# Patient Record
Sex: FEMALE | Race: WHITE | Employment: OTHER | URBAN - METROPOLITAN AREA
[De-identification: names, ages, dates, MRNs, and addresses within clinical notes are randomized per-mention and may not be internally consistent; named-entity substitution may affect disease eponyms.]

---

## 2018-01-01 ENCOUNTER — OFFICE VISIT (OUTPATIENT)
Dept: CARDIOLOGY | Facility: CLINIC | Age: 58
End: 2018-01-01
Attending: INTERNAL MEDICINE
Payer: MEDICARE

## 2018-01-01 ENCOUNTER — HOSPITAL ENCOUNTER (OUTPATIENT)
Facility: CLINIC | Age: 58
End: 2018-01-01
Admitting: INTERNAL MEDICINE
Payer: MEDICARE

## 2018-01-01 ENCOUNTER — CARE COORDINATION (OUTPATIENT)
Dept: CARDIOLOGY | Facility: CLINIC | Age: 58
End: 2018-01-01

## 2018-01-01 ENCOUNTER — TELEPHONE (OUTPATIENT)
Dept: GASTROENTEROLOGY | Facility: CLINIC | Age: 58
End: 2018-01-01

## 2018-01-01 ENCOUNTER — HOSPITAL ENCOUNTER (OUTPATIENT)
Dept: CARDIOLOGY | Facility: CLINIC | Age: 58
Discharge: HOME | End: 2018-09-04
Attending: INTERNAL MEDICINE | Admitting: INTERNAL MEDICINE
Payer: MEDICARE

## 2018-01-01 ENCOUNTER — RADIANT APPOINTMENT (OUTPATIENT)
Dept: ULTRASOUND IMAGING | Facility: CLINIC | Age: 58
End: 2018-01-01
Attending: INTERNAL MEDICINE
Payer: MEDICARE

## 2018-01-01 ENCOUNTER — TELEPHONE (OUTPATIENT)
Dept: TRANSPLANT | Facility: CLINIC | Age: 58
End: 2018-01-01

## 2018-01-01 ENCOUNTER — TRANSFERRED RECORDS (OUTPATIENT)
Dept: HEALTH INFORMATION MANAGEMENT | Facility: CLINIC | Age: 58
End: 2018-01-01

## 2018-01-01 ENCOUNTER — PATIENT OUTREACH (OUTPATIENT)
Dept: CARE COORDINATION | Facility: CLINIC | Age: 58
End: 2018-01-01

## 2018-01-01 ENCOUNTER — ALLIED HEALTH/NURSE VISIT (OUTPATIENT)
Dept: TRANSPLANT | Facility: CLINIC | Age: 58
End: 2018-01-01
Attending: INTERNAL MEDICINE
Payer: MEDICARE

## 2018-01-01 ENCOUNTER — COMMITTEE REVIEW (OUTPATIENT)
Dept: TRANSPLANT | Facility: CLINIC | Age: 58
End: 2018-01-01

## 2018-01-01 ENCOUNTER — OFFICE VISIT (OUTPATIENT)
Dept: CARDIOLOGY | Facility: CLINIC | Age: 58
End: 2018-01-01
Attending: THORACIC SURGERY (CARDIOTHORACIC VASCULAR SURGERY)
Payer: MEDICARE

## 2018-01-01 ENCOUNTER — HOSPITAL ENCOUNTER (OUTPATIENT)
Dept: CT IMAGING | Facility: CLINIC | Age: 58
Discharge: HOME | End: 2018-10-04
Attending: INTERNAL MEDICINE | Admitting: INTERNAL MEDICINE
Payer: MEDICARE

## 2018-01-01 ENCOUNTER — PRE VISIT (OUTPATIENT)
Dept: PULMONOLOGY | Facility: CLINIC | Age: 58
End: 2018-01-01

## 2018-01-01 ENCOUNTER — PRE VISIT (OUTPATIENT)
Dept: CARDIOLOGY | Facility: CLINIC | Age: 58
End: 2018-01-01

## 2018-01-01 ENCOUNTER — TELEPHONE (OUTPATIENT)
Dept: CARDIOLOGY | Facility: CLINIC | Age: 58
End: 2018-01-01

## 2018-01-01 ENCOUNTER — OFFICE VISIT (OUTPATIENT)
Dept: GASTROENTEROLOGY | Facility: CLINIC | Age: 58
End: 2018-01-01
Attending: INTERNAL MEDICINE
Payer: MEDICARE

## 2018-01-01 VITALS — HEIGHT: 62 IN | BODY MASS INDEX: 23.92 KG/M2 | WEIGHT: 130 LBS

## 2018-01-01 VITALS
SYSTOLIC BLOOD PRESSURE: 138 MMHG | HEIGHT: 60 IN | HEART RATE: 78 BPM | WEIGHT: 130 LBS | OXYGEN SATURATION: 100 % | BODY MASS INDEX: 25.52 KG/M2 | DIASTOLIC BLOOD PRESSURE: 81 MMHG

## 2018-01-01 VITALS
BODY MASS INDEX: 21.5 KG/M2 | OXYGEN SATURATION: 100 % | HEART RATE: 65 BPM | HEIGHT: 60 IN | WEIGHT: 109.5 LBS | SYSTOLIC BLOOD PRESSURE: 122 MMHG | DIASTOLIC BLOOD PRESSURE: 78 MMHG

## 2018-01-01 VITALS
BODY MASS INDEX: 21.55 KG/M2 | HEART RATE: 65 BPM | WEIGHT: 109.8 LBS | HEIGHT: 60 IN | SYSTOLIC BLOOD PRESSURE: 122 MMHG | DIASTOLIC BLOOD PRESSURE: 78 MMHG

## 2018-01-01 DIAGNOSIS — Z79.899 OTHER LONG TERM (CURRENT) DRUG THERAPY: ICD-10-CM

## 2018-01-01 DIAGNOSIS — I35.0 SEVERE AORTIC STENOSIS: ICD-10-CM

## 2018-01-01 DIAGNOSIS — K74.60 CIRRHOSIS (H): ICD-10-CM

## 2018-01-01 DIAGNOSIS — Z01.818 PRE-TRANSPLANT EVALUATION FOR LIVER TRANSPLANT: Primary | ICD-10-CM

## 2018-01-01 DIAGNOSIS — K74.60 HEPATIC CIRRHOSIS DUE TO CHRONIC HEPATITIS C INFECTION (H): Primary | ICD-10-CM

## 2018-01-01 DIAGNOSIS — B19.20 HEPATITIS C: ICD-10-CM

## 2018-01-01 DIAGNOSIS — B19.20 HEPATITIS C VIRUS: ICD-10-CM

## 2018-01-01 DIAGNOSIS — I35.0 AORTIC VALVE STENOSIS, ETIOLOGY OF CARDIAC VALVE DISEASE UNSPECIFIED: ICD-10-CM

## 2018-01-01 DIAGNOSIS — Z76.82 AWAITING ORGAN TRANSPLANT STATUS: Primary | ICD-10-CM

## 2018-01-01 DIAGNOSIS — I35.0 SEVERE AORTIC STENOSIS: Primary | ICD-10-CM

## 2018-01-01 DIAGNOSIS — K72.10 END-STAGE LIVER DISEASE (H): ICD-10-CM

## 2018-01-01 DIAGNOSIS — F17.200 SMOKER: ICD-10-CM

## 2018-01-01 DIAGNOSIS — I35.0 NONRHEUMATIC AORTIC VALVE STENOSIS: Primary | ICD-10-CM

## 2018-01-01 DIAGNOSIS — K74.60 CIRRHOSIS (H): Primary | ICD-10-CM

## 2018-01-01 DIAGNOSIS — R09.89 CAROTID BRUIT, UNSPECIFIED LATERALITY: ICD-10-CM

## 2018-01-01 DIAGNOSIS — B18.2 HEPATIC CIRRHOSIS DUE TO CHRONIC HEPATITIS C INFECTION (H): Primary | ICD-10-CM

## 2018-01-01 DIAGNOSIS — Z01.818 PRE-OP EXAM: Primary | ICD-10-CM

## 2018-01-01 DIAGNOSIS — B19.20 HEPATITIS C VIRUS: Primary | ICD-10-CM

## 2018-01-01 DIAGNOSIS — B19.20 HEPATITIS C: Primary | ICD-10-CM

## 2018-01-01 DIAGNOSIS — R93.1 ABNORMAL FINDINGS DIAGNOSTIC IMAGING OF HEART AND CORONARY CIRCULATION: ICD-10-CM

## 2018-01-01 LAB
ABO + RH BLD: NORMAL
AFP SERPL-MCNC: 2.9 UG/L (ref 0–8)
ALBUMIN SERPL-MCNC: 1.7 G/DL (ref 3.4–5)
ALBUMIN SERPL-MCNC: 2.2 G/DL (ref 3.4–5)
ALBUMIN SERPL-MCNC: 2.9 G/DL (ref 3.4–5)
ALBUMIN UR-MCNC: NEGATIVE MG/DL
ALP SERPL-CCNC: 126 U/L (ref 40–150)
ALP SERPL-CCNC: 127 U/L (ref 40–150)
ALP SERPL-CCNC: 164 U/L (ref 40–150)
ALT SERPL W P-5'-P-CCNC: 35 U/L (ref 0–50)
ALT SERPL W P-5'-P-CCNC: 38 U/L (ref 0–50)
ALT SERPL W P-5'-P-CCNC: 43 U/L (ref 0–50)
ALT SERPL-CCNC: 45 U/L (ref 7–45)
ANION GAP SERPL CALCULATED.3IONS-SCNC: 10 MMOL/L (ref 3–14)
ANION GAP SERPL CALCULATED.3IONS-SCNC: 8 MMOL/L (ref 3–14)
ANION GAP SERPL CALCULATED.3IONS-SCNC: 8 MMOL/L (ref 3–14)
APPEARANCE UR: ABNORMAL
AST SERPL W P-5'-P-CCNC: 62 U/L (ref 0–45)
AST SERPL W P-5'-P-CCNC: 72 U/L (ref 0–45)
AST SERPL W P-5'-P-CCNC: 99 U/L (ref 0–45)
AST SERPL-CCNC: 106 U/L (ref 8–43)
BACTERIA SPEC CULT: NORMAL
BILIRUB DIRECT SERPL-MCNC: 2.2 MG/DL (ref 0–0.2)
BILIRUB DIRECT SERPL-MCNC: 2.7 MG/DL (ref 0–0.2)
BILIRUB SERPL-MCNC: 4.6 MG/DL (ref 0.2–1.3)
BILIRUB SERPL-MCNC: 7.2 MG/DL (ref 0.2–1.3)
BILIRUB SERPL-MCNC: 7.5 MG/DL (ref 0.2–1.3)
BILIRUB UR QL STRIP: NEGATIVE
BLD GP AB SCN SERPL QL: NORMAL
BLD GP AB SCN TITR SERPL: NORMAL {TITER}
BLOOD BANK CMNT PATIENT-IMP: NORMAL
BUN SERPL-MCNC: 15 MG/DL (ref 7–30)
BUN SERPL-MCNC: 34 MG/DL (ref 7–30)
BUN SERPL-MCNC: 42 MG/DL (ref 7–30)
CALCIUM SERPL-MCNC: 8.3 MG/DL (ref 8.5–10.1)
CALCIUM SERPL-MCNC: 8.4 MG/DL (ref 8.5–10.1)
CALCIUM SERPL-MCNC: 8.8 MG/DL (ref 8.5–10.1)
CHLORIDE SERPL-SCNC: 107 MMOL/L (ref 94–109)
CHLORIDE SERPL-SCNC: 108 MMOL/L (ref 94–109)
CHLORIDE SERPL-SCNC: 115 MMOL/L (ref 94–109)
CHOLEST SERPL-MCNC: 72 MG/DL
CO2 SERPL-SCNC: 19 MMOL/L (ref 20–32)
CO2 SERPL-SCNC: 22 MMOL/L (ref 20–32)
CO2 SERPL-SCNC: 22 MMOL/L (ref 20–32)
COLOR UR AUTO: ABNORMAL
CREAT SERPL-MCNC: 0.88 MG/DL (ref 0.59–1.04)
CREAT SERPL-MCNC: 0.93 MG/DL (ref 0.52–1.04)
CREAT SERPL-MCNC: 1.85 MG/DL (ref 0.52–1.04)
CREAT SERPL-MCNC: 2.82 MG/DL (ref 0.52–1.04)
CREAT UR-MCNC: 153 MG/DL
DEPRECATED CALCIDIOL+CALCIFEROL SERPL-MC: 6 UG/L (ref 20–75)
DLCOUNC-%PRED-PRE: 41 %
DLCOUNC-PRE: 8.38 ML/MIN/MMHG
DLCOUNC-PRED: 20.35 ML/MIN/MMHG
ERV-%PRED-PRE: 28 %
ERV-PRE: 0.23 L
ERV-PRED: 0.83 L
ERYTHROCYTE [DISTWIDTH] IN BLOOD BY AUTOMATED COUNT: 20 % (ref 10–15)
ERYTHROCYTE [DISTWIDTH] IN BLOOD BY AUTOMATED COUNT: 21.2 % (ref 10–15)
ERYTHROCYTE [DISTWIDTH] IN BLOOD BY AUTOMATED COUNT: ABNORMAL % (ref 10–15)
ETHYL GLUCURONIDE UR QL: NEGATIVE
EXPTIME-PRE: 6.24 SEC
FEF2575-%PRED-PRE: 48 %
FEF2575-PRE: 1.07 L/SEC
FEF2575-PRED: 2.2 L/SEC
FEFMAX-%PRED-PRE: 40 %
FEFMAX-PRE: 2.37 L/SEC
FEFMAX-PRED: 5.86 L/SEC
FEV1-%PRED-PRE: 56 %
FEV1-PRE: 1.26 L
FEV1FEV6-PRE: 77 %
FEV1FEV6-PRED: 81 %
FEV1FVC-PRE: 77 %
FEV1FVC-PRED: 79 %
FEV1SVC-PRE: 70 %
FEV1SVC-PRED: 80 %
FIFMAX-PRE: 2.12 L/SEC
FRCPLETH-%PRED-PRE: 104 %
FRCPLETH-PRE: 2.58 L
FRCPLETH-PRED: 2.47 L
FVC-%PRED-PRE: 58 %
FVC-PRE: 1.65 L
FVC-PRED: 2.82 L
GFR SERPL CREATININE-BSD FRML MDRD: 17 ML/MIN/1.7M2
GFR SERPL CREATININE-BSD FRML MDRD: 28 ML/MIN/1.7M2
GFR SERPL CREATININE-BSD FRML MDRD: 62 ML/MIN/1.7M2
GFR SERPL CREATININE-BSD FRML MDRD: 74 ML/MIN/BSA
GLUCOSE SERPL-MCNC: 104 MG/DL (ref 70–99)
GLUCOSE SERPL-MCNC: 107 MG/DL (ref 70–140)
GLUCOSE SERPL-MCNC: 110 MG/DL (ref 70–99)
GLUCOSE SERPL-MCNC: 155 MG/DL (ref 70–99)
GLUCOSE UR STRIP-MCNC: NEGATIVE MG/DL
HBA1C MFR BLD: 4.2 % (ref 4–5.6)
HCG SERPL QL: NEGATIVE
HCT VFR BLD AUTO: 21.9 % (ref 35–47)
HCT VFR BLD AUTO: 28.4 % (ref 35–47)
HCT VFR BLD AUTO: 29.5 % (ref 35–47)
HDLC SERPL-MCNC: 15 MG/DL
HEP C HIM: ABNORMAL
HGB BLD-MCNC: 6.9 G/DL (ref 11.7–15.7)
HGB BLD-MCNC: 9.5 G/DL (ref 11.7–15.7)
HGB BLD-MCNC: 9.8 G/DL (ref 11.7–15.7)
HGB UR QL STRIP: ABNORMAL
HYALINE CASTS #/AREA URNS LPF: 26 /LPF (ref 0–2)
IC-%PRED-PRE: 78 %
IC-PRE: 1.56 L
IC-PRED: 2 L
INR PPP: 2.62 (ref 0.86–1.14)
INR PPP: 2.89 (ref 0.86–1.14)
INTERPRETATION ECG - MUSE: NORMAL
IRON SATN MFR SERPL: 91 % (ref 15–46)
IRON SERPL-MCNC: 133 UG/DL (ref 35–180)
KETONES UR STRIP-MCNC: NEGATIVE MG/DL
LDLC SERPL CALC-MCNC: 46 MG/DL
LEUKOCYTE ESTERASE UR QL STRIP: ABNORMAL
M TB TUBERC IFN-G BLD QL: NEGATIVE
M TB TUBERC IFN-G/MITOGEN IGNF BLD: 0 IU/ML
MCH RBC QN AUTO: 31.8 PG (ref 26.5–33)
MCH RBC QN AUTO: 34 PG (ref 26.5–33)
MCH RBC QN AUTO: 34.7 PG (ref 26.5–33)
MCHC RBC AUTO-ENTMCNC: 31.5 G/DL (ref 31.5–36.5)
MCHC RBC AUTO-ENTMCNC: 33.2 G/DL (ref 31.5–36.5)
MCHC RBC AUTO-ENTMCNC: 33.5 G/DL (ref 31.5–36.5)
MCV RBC AUTO: 104 FL (ref 78–100)
MCV RBC AUTO: 108 FL (ref 78–100)
MCV RBC AUTO: 96 FL (ref 78–100)
MUCOUS THREADS #/AREA URNS LPF: PRESENT /LPF
NITRATE UR QL: NEGATIVE
NONHDLC SERPL-MCNC: 57 MG/DL
PH UR STRIP: 5 PH (ref 5–7)
PHOSPHATE SERPL-MCNC: 5.5 MG/DL (ref 2.5–4.5)
PLATELET # BLD AUTO: 40 10E9/L (ref 150–450)
PLATELET # BLD AUTO: 54 10E9/L (ref 150–450)
PLATELET # BLD AUTO: 65 10E9/L (ref 150–450)
POTASSIUM SERPL-SCNC: 3.5 MMOL/L (ref 3.4–5.3)
POTASSIUM SERPL-SCNC: 4.2 MMOL/L (ref 3.4–5.3)
POTASSIUM SERPL-SCNC: 4.2 MMOL/L (ref 3.4–5.3)
POTASSIUM SERPL-SCNC: 4.2 MMOL/L (ref 3.6–5.2)
PROT SERPL-MCNC: 6.7 G/DL (ref 6.8–8.8)
PROT SERPL-MCNC: 7 G/DL (ref 6.8–8.8)
PROT SERPL-MCNC: 7.7 G/DL (ref 6.8–8.8)
PROT UR-MCNC: 0.36 G/L
PROT/CREAT 24H UR: 0.24 G/G CR (ref 0–0.2)
RADIOLOGIST FLAGS: NORMAL
RBC # BLD AUTO: 2.03 10E12/L (ref 3.8–5.2)
RBC # BLD AUTO: 2.74 10E12/L (ref 3.8–5.2)
RBC # BLD AUTO: 3.08 10E12/L (ref 3.8–5.2)
RBC #/AREA URNS AUTO: 5 /HPF (ref 0–2)
RVPLETH-%PRED-PRE: 140 %
RVPLETH-PRE: 2.35 L
RVPLETH-PRED: 1.67 L
SODIUM SERPL-SCNC: 136 MMOL/L (ref 133–144)
SODIUM SERPL-SCNC: 138 MMOL/L (ref 133–144)
SODIUM SERPL-SCNC: 144 MMOL/L (ref 133–144)
SOURCE: ABNORMAL
SP GR UR STRIP: 1.02 (ref 1–1.03)
SPECIMEN EXP DATE BLD: NORMAL
SPECIMEN EXP DATE BLD: NORMAL
SPECIMEN SOURCE: NORMAL
SQUAMOUS #/AREA URNS AUTO: 8 /HPF (ref 0–1)
T PALLIDUM AB SER QL: NONREACTIVE
TIBC SERPL-MCNC: 146 UG/DL (ref 240–430)
TLCPLETH-%PRED-PRE: 97 %
TLCPLETH-PRE: 4.15 L
TLCPLETH-PRED: 4.27 L
TRANS CELLS #/AREA URNS HPF: 2 /HPF
TRANSFERRIN SERPL-MCNC: 117 MG/DL (ref 210–360)
TRIGL SERPL-MCNC: 53 MG/DL
TSH SERPL-ACNC: 1.2 MIU/L (ref 0.3–4.2)
UROBILINOGEN UR STRIP-MCNC: 0 MG/DL (ref 0–2)
VA-%PRED-PRE: 77 %
VA-PRE: 3.43 L
VC-%PRED-PRE: 63 %
VC-PRE: 1.8 L
VC-PRED: 2.83 L
WBC # BLD AUTO: 6.1 10E9/L (ref 4–11)
WBC # BLD AUTO: 8.1 10E9/L (ref 4–11)
WBC # BLD AUTO: 9.4 10E9/L (ref 4–11)
WBC #/AREA URNS AUTO: 17 /HPF (ref 0–5)

## 2018-01-01 PROCEDURE — 82306 VITAMIN D 25 HYDROXY: CPT | Performed by: INTERNAL MEDICINE

## 2018-01-01 PROCEDURE — 71275 CT ANGIOGRAPHY CHEST: CPT | Mod: 26 | Performed by: INTERNAL MEDICINE

## 2018-01-01 PROCEDURE — 85027 COMPLETE CBC AUTOMATED: CPT | Performed by: INTERNAL MEDICINE

## 2018-01-01 PROCEDURE — 85610 PROTHROMBIN TIME: CPT | Performed by: INTERNAL MEDICINE

## 2018-01-01 PROCEDURE — 93005 ELECTROCARDIOGRAM TRACING: CPT | Mod: ZF

## 2018-01-01 PROCEDURE — 84466 ASSAY OF TRANSFERRIN: CPT | Performed by: INTERNAL MEDICINE

## 2018-01-01 PROCEDURE — 84156 ASSAY OF PROTEIN URINE: CPT | Mod: XU | Performed by: INTERNAL MEDICINE

## 2018-01-01 PROCEDURE — 99204 OFFICE O/P NEW MOD 45 MIN: CPT | Mod: 25 | Performed by: INTERNAL MEDICINE

## 2018-01-01 PROCEDURE — 86900 BLOOD TYPING SEROLOGIC ABO: CPT | Performed by: INTERNAL MEDICINE

## 2018-01-01 PROCEDURE — 25000128 H RX IP 250 OP 636: Performed by: INTERNAL MEDICINE

## 2018-01-01 PROCEDURE — 93306 TTE W/DOPPLER COMPLETE: CPT | Mod: 26 | Performed by: INTERNAL MEDICINE

## 2018-01-01 PROCEDURE — 81001 URINALYSIS AUTO W/SCOPE: CPT | Performed by: INTERNAL MEDICINE

## 2018-01-01 PROCEDURE — 93306 TTE W/DOPPLER COMPLETE: CPT

## 2018-01-01 PROCEDURE — 87086 URINE CULTURE/COLONY COUNT: CPT | Performed by: INTERNAL MEDICINE

## 2018-01-01 PROCEDURE — 74174 CTA ABD&PLVS W/CONTRAST: CPT | Mod: 26 | Performed by: INTERNAL MEDICINE

## 2018-01-01 PROCEDURE — G0463 HOSPITAL OUTPT CLINIC VISIT: HCPCS | Mod: 25,ZF

## 2018-01-01 PROCEDURE — 36415 COLL VENOUS BLD VENIPUNCTURE: CPT | Performed by: INTERNAL MEDICINE

## 2018-01-01 PROCEDURE — 80321 ALCOHOLS BIOMARKERS 1OR 2: CPT | Performed by: INTERNAL MEDICINE

## 2018-01-01 PROCEDURE — 80048 BASIC METABOLIC PNL TOTAL CA: CPT | Performed by: INTERNAL MEDICINE

## 2018-01-01 PROCEDURE — 86780 TREPONEMA PALLIDUM: CPT | Performed by: INTERNAL MEDICINE

## 2018-01-01 PROCEDURE — 82105 ALPHA-FETOPROTEIN SERUM: CPT | Performed by: INTERNAL MEDICINE

## 2018-01-01 PROCEDURE — 80053 COMPREHEN METABOLIC PANEL: CPT | Performed by: INTERNAL MEDICINE

## 2018-01-01 PROCEDURE — 93010 ELECTROCARDIOGRAM REPORT: CPT | Mod: ZP | Performed by: INTERNAL MEDICINE

## 2018-01-01 PROCEDURE — 86886 COOMBS TEST INDIRECT TITER: CPT | Performed by: INTERNAL MEDICINE

## 2018-01-01 PROCEDURE — 74174 CTA ABD&PLVS W/CONTRAST: CPT

## 2018-01-01 PROCEDURE — 99205 OFFICE O/P NEW HI 60 MIN: CPT | Mod: GC | Performed by: INTERNAL MEDICINE

## 2018-01-01 PROCEDURE — 86480 TB TEST CELL IMMUN MEASURE: CPT | Performed by: INTERNAL MEDICINE

## 2018-01-01 PROCEDURE — 80076 HEPATIC FUNCTION PANEL: CPT | Performed by: INTERNAL MEDICINE

## 2018-01-01 RX ORDER — BACLOFEN 20 MG
500 TABLET ORAL DAILY
COMMUNITY

## 2018-01-01 RX ORDER — NADOLOL 80 MG/1
80 TABLET ORAL DAILY
COMMUNITY
Start: 2018-01-01

## 2018-01-01 RX ORDER — PYRIDOXINE HCL (VITAMIN B6) 100 MG
1 TABLET ORAL SEE ADMIN INSTRUCTIONS
COMMUNITY

## 2018-01-01 RX ORDER — IOPAMIDOL 755 MG/ML
70 INJECTION, SOLUTION INTRAVASCULAR ONCE
Status: COMPLETED | OUTPATIENT
Start: 2018-01-01 | End: 2018-01-01

## 2018-01-01 RX ORDER — LACTULOSE 10 G/15ML
10 SOLUTION ORAL; RECTAL SEE ADMIN INSTRUCTIONS
COMMUNITY
Start: 2018-01-01

## 2018-01-01 RX ORDER — OMEGA-3S/DHA/EPA/FISH OIL/D3 300MG-1000
400 CAPSULE ORAL SEE ADMIN INSTRUCTIONS
COMMUNITY

## 2018-01-01 RX ORDER — ACETAMINOPHEN 500 MG
1000 TABLET ORAL
COMMUNITY

## 2018-01-01 RX ORDER — PANTOPRAZOLE SODIUM 40 MG/1
40 TABLET, DELAYED RELEASE ORAL 2 TIMES DAILY
COMMUNITY
Start: 2018-01-01

## 2018-01-01 RX ADMIN — IOPAMIDOL 70 ML: 755 INJECTION, SOLUTION INTRAVENOUS at 13:05

## 2018-01-01 ASSESSMENT — PAIN SCALES - GENERAL
PAINLEVEL: NO PAIN (0)

## 2018-08-03 NOTE — TELEPHONE ENCOUNTER
Received a call from Dr Nolberto Aguilar- ([place]) Has a patient he sees in Outreach in [facility]. Per Insurance- has to pay 20 % thru them and and better [place] the  of [place]  He will fax his clinic notes labs and tests done. He stated echo done there- Aorta Stenosis and would need Cardiac evaluation  MELD 24      Intake Progress Note    Organ:  Liver     Initial Call Made by: call from Dr Aguilar with pt name, , Phone #    Referring Physician: PCP- Dr Mateo [place]     Assigned Coordinator: Camila Clements    Reported Diagnosis: Cirrhosis     On Dialysis: No    Dialysis Schedule:  Days/shift  or N/A    Reported Medical History: Cirrhosis- Paracentesis- was as needed and was often- better control lately     Records:  I  will request.     Clinic RN Appt (Only Adult Kidney, Liver and Pancreas Referrals): Y or N    Preferred Evaluation Start Date:  ASAP     Online Forms    Best time patient can be reached:any time     Type of packet sent:  Liver packet      Misc. Notes: May speak with emergency contacts listed in OTTR. Boyfriend- Ajay or Son -Ayden      Verbal Consent: Y     Email Consent: Y or N    If no PCP or referring information the time of call informed pt to call back with information: Y or N    Informed patient to call if doesn't receive packet and or phone call from coordinator within given time- Y    Insurance- Medicare- [id number]  [employer] [id number]  [facility]- [id number]

## 2018-08-03 NOTE — LETTER
August 3, 2018    Jessica Hallman  [address]  [facility] [address]      Dear Jessica,    Thank you for your interest in the Transplant Center at [facility], [facility]. We look forward to being a part of your care team and assisting you through the transplant process.    As we discussed, your transplant coordinator is Camila Clements (Liver).  You may call your coordinator at any time with questions or concern, call [phone] option 4 .    Please complete the following.    1. Fill out and return the enclosed forms    Authorization for Electronic Communication    Authorization to Discuss Protected Health Information    "[employer] Release of Information    2. Sign up for:    [employer], access to your electronic medical record (see enclosed pamphlet)    [employer], a transplant education website (see enclosed booklet)    You can use these tools to learn more about your transplant, communicate with your care team, and track your medical details    Sincerely,    Solid Organ Transplant  [facility], [facility]    cc: Care Team

## 2018-08-03 NOTE — LETTER
August 3, 2018    Jessica Hallman  [address]  [facility] [address]    Dear Jessica,    Thank you for your interest in the Transplant Center at [facility], [facility]. We look forward to being a part of your care team and assisting you through the transplant process.    As we discussed, your transplant coordinator is Camila Clements (Liver).  You may call your coordinator at any time with questions or concerns, call [phone] option 4.    Please complete the following.    1. Fill out and return the enclosed forms    Authorization to Discuss Protected Health Information    [employer] Release of Information    2. Sign up for:    [employer], access to your electronic medical record (see enclosed pamphlet)    [employer], a transplant education website (see enclosed booklet)    You can use these tools to learn more about your transplant, communicate with your care team, and track your medical details      Sincerely,      Solid Organ Transplant  [facility], [facility]    cc: Care Team

## 2018-08-03 NOTE — Clinical Note
Eval on 9/4 , please have labs, liver US and then the txp team appointments.  I may need to do 1:1 teaching unless you can set up other tests/consult on the following Monday, which I would prefer.

## 2018-08-09 NOTE — TELEPHONE ENCOUNTER
"Referred by:Dr Nolberto Aguilar- [facility].      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 6/2018 admitted for worsening ascites and hematemesis with EGD- no bleeding, grade 1 varices.      MELD 24-26 per records. 7/30/2018:  Creat 0.88, Na 143, Bili T 6.1, alb 2.3. No INR or plts    Patient lives with her significant other, Ajay, and she has been on disability for 5 years. Smoker and denies ETOH use.    PMH:  Severe calcified aortic stenosis and \" spot on mammogram\"     Surgical History: appendectomy    Plan: After receipt of evaluation place orders. Will set up with Cardiothoracic Surgeon if appropriate.           "

## 2018-08-09 NOTE — TELEPHONE ENCOUNTER
Spoke with referring, [place], and patient was seen for a day. Had 6min walking test , EKG, echo, chest and dexa. Working on obtaining records. Orders placed.

## 2018-08-17 NOTE — LETTER
August 24, 2018    LIVER TRANSPLANT  EVALUATION SCHEDULE    Patient:   Jessica Hallman  MR#:    [id number]  Coordinator:  Camila Clements  [phone]  :     Yazmin HAQUE   [phone]  Location:    [facility]  Dates:   August 27, September 4 and September 21, 2018    This is your evaluation schedule, please follow dates and times.  You will receive reminder phone calls for other tests, but please follow this schedule only!  If you have any questions about dates and times, please call us on the number listed above.  Thank you, Transplant Services       No food or drink after midnight (Sunday) for labs & ultrasound (you may only have small amounts of water)    Day/Date:  Monday, August 27, 2018  Time Location Activity   6:45 am Imaging and Lab testing  ([address] [place] [address],  [address] [place] [address]; [facility] [address]) Liver Ultrasound with dopplers  NO FOOD OR DRINK AFTER MIDNIGHT   7:30 am Imaging and Lab testing  ([address] [place] [address]) Blood tests   NO FOOD OR DRINK AFTER MIDNIGHT   8:40 am Imaging and Lab Testing  ([address] [place] [address]) EKG   9:00 am [facility]   ([address] [place] [address]) Pulmonary Function Tests  PLEASE DO NOT WEAR ANY PERFUME, DEODORANT OR SCENTED PRODUCTS   10:00 am to 12:00 pm Transplant Services  ([address] [place] [address]) Pre-transplant class and meet with Camila Clements RN, Transplant Coordinator       * IF ONLINE CHECK IN IS NOT DONE, YOU WILL NEED TO CHECK IN AT LEAST 15 MINUTES EARLIER THAN THE FIRST SCHEDULED APPOINTMENT *      No beta blockers the day before or day of the the dobutamine stress echo    No caffeine/alcohol twelve (12) hours before the dobutamine stress echo    No food or drink three (3) hours before the dobutamine stress echo    Day/Date:  Tuesday, September 4, 2018  Time Location Activity   8:00 am [facility] Waiting Room  (2nd [address]  [facility]; [facility] [address]) Dobutamine Stress Echo  See enclosed instructions for test!    No food or drink three (3) hours before test    No caffeine/alcohol twelve (12) hrs before test    No beta blockers the day before or day of the test   9:00 am [facility] - 2nd Floor/Entrance  [facility] Take [facility] to Clinics & Surgery Center  (The shuttle is white with maroon letters)   9:30 am Transplant Services  (3rd floor [facility],  [address] [place] [address]; [facility] [address]) Review evaluation process & daily schedule   10:00 am Transplant Services  (3rd floor [facility]) Appointment with Yolanda Palumbo,  Registered Dietitian   10:30 am Medicine Specialties  (3rd floor [facility]) Appointment with Dr. Rice,  Transplant Surgeon   11:00 am [facility] Specialties  (3rd floor [facility]) Appointment with Dr. Kaye,  Hepatologist   12:00 pm Transplant Services  (3rd floor [facility]) Appointment with Jasvir Chavez,     1:00 pm Transplant Services  (3rd floor [facility]) Check out with Nursing Staff   2:30 pm [facility]  (3rd floor [facility]) Appointment with Dr. Hall,  Cardiology     I saved this pulmonology appointment for you; if it doesn t work with your schedule, please call me and we can find a different date and/or time for you.    Day/Date:  Friday, September 21, 2018  Time Location Activity   1:40 pm [facility]   (3rd floor [facility],  [address] [place] [address]; [facility] [address]) Appointment with Dr. Everardo Dunlap,  Pulmonary         Day/Date:  Every Thursday *OPTIONAL*  Time Location Activity   12:00 pm to 1:30 pm Liver Transplant Support Group  (7th floor [facility],  [address]; [facility] [address])  Hospital Station 7B  Room [phone] Every Thursday *OPTIONAL*

## 2018-08-24 NOTE — TELEPHONE ENCOUNTER
August 24, 2018: I spoke with Jessica who agreed to come for testing and transplant class on August 27. She will come for the evaluation appointments on September 4. I assured her I would call her later today since I can't UPS a schedule to a PO Box nor send an email to her (because she doesn't know what her email address is).    Yazmin Montalvo  Transplant   [phone]    Message  Received: 2 weeks ago       Camila Clements, RN  [facility]       Eval on 9/4 , please have labs, liver US and then the txp team appointments.   I may need to do 1:1 teaching unless you can set up other tests/consult on the following Monday, which I would prefer.          Telephone for Transplant Evaluation  8/3/2018       Camila Clements RN - [facility] Solid Organ Transplant Encounter Summary       Diagnoses       Cirrhosis (h) (Primary)       Other long term (current) drug therapy; Hepatitis C; Smoker                Orders Signed This Encounter (28)      PULMONARY MEDICINE REFERRAL        Treponema Abs w Reflex to RPR and Titer        Protein  random urine with Creat Ratio        Ethyl Glucuronide Urine        UA reflex to Microscopic and Culture        CBC with platelets        INR        Vitamin D Deficiency        Transferrin        Phosphorus        M Tuberculosis by Quantiferon        Iron and iron binding capacity        HCG qualitative (female only)        AFP tumor marker        Hepatic panel        Basic metabolic panel        Antibody titer red cell        ABO/Rh type and screen        PFT Lab Testing        Echo dobutamine stress test        EKG 12-lead, tracing only [EKG1]        US Abdomen Complete w Doppler Complete        NUTRITION REFERRAL        CARDIOLOGY EVAL ADULT REFERRAL         REFERRAL        GENERAL SURG ADULT REFERRAL        GASTROENTEROLOGY ADULT REF CONSULT ONLY        Pre-Transplant Class

## 2018-08-24 NOTE — TELEPHONE ENCOUNTER
I spoke with Jessica again who asked me to try emailing her at [email or web address]. I sent the following email (which subsequently bounced).  ---------------------------------------  Dear MsEdis Lexx:    Thank you for coming to the [facility] on Monday, Monday, August 27, 2018 for some of your liver evaluation appointments.    The address is [address] [place] [address]; [place] [address]    We need you to avoid eating and drinking after midnight (Sunday) so we can get fasting labs and an ultrasound. You may only have small amounts of water.    First Floor:  6:45 am - Liver Ultrasound with dopplers  7:30 am - Imaging and Lab testing  8:15 am - time to eat breakfast at the Café  8:40 am - EKG    Third Floor:9:00 am - Pulmonary Function Tests (please do not wear any perfume, deodorant or scented products)  10:00 am to 12:00 pm - Pre-transplant class and meet with Camila Clements RN, Transplant Coordinator-----------------------------------    Here are your appointments on Tuesday, September 4, 2018. Your appointments will start four blocks away from the [facility].    Street [facility], [facility], [address] [place] [address]; [place] [address]    No beta blockers the day before or day of the the dobutamine stress echo  No caffeine/alcohol twelve (12) hours before the dobutamine stress echo  No food or drink three (3) hours before the dobutamine stress echo    8:00 am - Dobutamine Stress Echo  9:00 am - take the "[employer] Shuttle (catch it on street [facility], which is the 2nd Floor/Entrance of the [facility]). Take it to [facility] [place]    Third Floor of the [facility] [place], [address] [place] [address]; [place] [address]:    9:30 am - Review evaluation process & daily schedule  10:00 am - Appointment with Yolanda Palumbo, Registered Dietitian  10:30 am - Appointment with Dr. Rice, Transplant Surgeon  11:00 am - Appointment with   Vargas, Hepatologist  12:00 pm - Appointment with Jasvir Chavez,   2:30 pm - Appointment with Dr. Hall, Cardiology  -----------------------------------    We also need an appointment with the lung doctor. I scheduled the first-available appointment on Friday, September 21, 2018    1:40 pm - Appointment with Dr. Dunlap, Pulmonary    This appointment will take about 80 minutes.    -----------------------------------  I am attaching a schedule, map and test instructions via PDF.    Please let me know if you have any questions.    Sincerely,    Yazmin Montalvo  Transplant   Phone [phone]  Fax [phone]  [email or web address]

## 2018-08-27 NOTE — TELEPHONE ENCOUNTER
Communicated to [facility] [place] ED charge RN that patient may be arriving there shortly and labs available for care everywhere.

## 2018-08-27 NOTE — TELEPHONE ENCOUNTER
Attempt to reach patient, Ida regarding labs. Patient had already left [facility] and per pulmonary tech planned to go to [place] ED. She was able to complete PFT test. Left message advising being seen in ED, preferable here.

## 2018-08-27 NOTE — TELEPHONE ENCOUNTER
Spoke to ICU RN and MD, gave contact information for transfer if needed. Patient is intubated with hemorraghic shock receiving transfusions and plan for EGD. No pressors yet.

## 2018-08-27 NOTE — TELEPHONE ENCOUNTER
DATE:  8/27/2018   TIME OF RECEIPT FROM LAB:  0845  LAB TEST:  hgb  LAB VALUE:  6.9  RESULTS GIVEN WITH READ-BACK TO (PROVIDER):  Camila Clements  TIME LAB VALUE REPORTED TO PROVIDER:   0899

## 2018-08-27 NOTE — TELEPHONE ENCOUNTER
Clinic called to check on patient's status, patient had apparently already left and this RN has attempted to call. See phone encounter.

## 2018-08-27 NOTE — TELEPHONE ENCOUNTER
DATE:  8/27/2018   TIME OF RECEIPT FROM LAB:  8:41 am  LAB TEST:  Hemoglobin   LAB VALUE:  6.9  RESULTS GIVEN WITH READ-BACK TO (PROVIDER):  Notified transplant team and routed results to Dr. Jacqui Kaye and Camila Clements RN Care Coordinator   TIME LAB VALUE REPORTED TO PROVIDER:   8:47 am

## 2018-08-29 NOTE — TELEPHONE ENCOUNTER
Spoke to Jessica and her significant other, Ajay, to ask if it is okay to give information to her son, Ayden who had called. Verbal consent received.

## 2018-09-03 NOTE — PROGRESS NOTES
[facility] Liver Transplant Evaluation    Requesting Provider and Health System: Dr. Nolberto Christensen, [facility]  Liver Disease: HCV cirrhosis    A/P  57 year old female with HCV cirrhosis. MELD 33 ABO A    CIRRHOSIS. 2/2 HCV and questions of ETOH but no definitive testing documenting alcohol use. Patient denies. HCV cleared.  Synthetic function significantly impaired with MELD 33. Decompensation: EV, ascites and HE    ASCITES. Intolerant to diuretics. Para done yesterday  CARLYN. Creat was 2.8 last week, now normalized to 0.86 after volume resuscitation.  THROMBOCYTOPENIA. 2/2 cirrhosis  HCC SCREENING.  UTCD with US last week. No masses  VARICEAL SCREENING. EGD done last week while on OSH. Grade 1 varices    ELEVATED Ca19-9. MRI unremarkable.    AORTIC STENOSIS. Valve 0.93 cm2. Severe. Reports no symptoms Unlikely candidate for surgery. Will preclude LT. Will send to cardiology for assessment.    DENTITION. Very poor dentition. Has not seen dentist in years.    SOCIAL SUPPORT. Support system with family here today  FUNCTIONAL STATUS. Very frail  LIVER TRANSPLANT CANDIDACY. With severe AS, would not be a candidate. Other issues of concern are history of noncompliance per outside records.    Pending cardiology assessment will decide if she will return her care to [facility] or here.     Jacqui Kaye MD  Hepatology/Liver Transplantation  Medical Director, Liver Transplantation  [facility]  ========================================================================    Subjective:  57 y F HCV cirrhosis. MELD 26, ABO A. Diagnosed in about 2013 when she was not feeling well and subsequently vomited blood. She was seen at [facility] by Dr. Christensen whom they describe as a good friend.    She is here with her SO Ajay, son Ayden and daughter.    MELD-Na score: 33 at 8/27/2018  8:04 AM  MELD score: 33 at 8/27/2018  8:04 AM  Calculated from:  Serum Creatinine: 2.82 mg/dL at 8/27/2018  8:04 AM  Serum  Sodium: 138 mmol/L (Rounded to 137) at 8/27/2018  8:04 AM  Total Bilirubin: 4.6 mg/dL at 8/27/2018  8:04 AM  INR(ratio): 2.62 at 8/27/2018  8:04 AM  Age: 57 years     MELD-Na score: 26 at 9/4/2018 10:11 AM  MELD score: 26 at 9/4/2018 10:11 AM  Calculated from:  Serum Creatinine: 0.93 mg/dL (Rounded to 1) at 9/4/2018 10:11 AM  Serum Sodium: 144 mmol/L (Rounded to 137) at 9/4/2018 10:11 AM  Total Bilirubin: 7.2 mg/dL at 9/4/2018 10:11 AM  INR(ratio): 2.89 at 9/4/2018 10:11 AM  Age: 57 years    -ascites, para q 1-3 weeks. Did not tolerate diuretics 2/2 lightheadedness  -HE on lactulose. 2-10 BM per day. Doesn't adjust with BM. 4 days of the week she has more than 5 BM. SO states she needs 2 doses daily or she gets confused.  -EV/GIB    HCV GT 1a treated and cured about 5 years ago  Concern regarding ETOH use per outside records but patient and family adamantly disagree with this.    She was seen 2013-16 by Dr. Christensen, then no visits until February 2018.     February 2018 she developed abdominal distention. Saw Dr. Christensen in [place]. He thought the picture in February was c/w ETOH hepatitis but she repeatedly denied ETOH use. I note her MCV was 103 at that time. AST was 96, ALT 32, Tbili 8, WBC 10. Over the next severl months she continued to decline. She last saw Dr. Christensen 8/2/18. His note states that she was not getting [facility] LT eval 2/2 lack of financial coverage with [facility], but she was also found to have severe AS (valve area 0.93 cm2) and he commented on concerns about her lack of compliance (note 7/16/18 by Dr. Christensen). He also expressed concerns for her social support.    I note her Ca19-9 was 292 on 7/30/18. AFP 3.1. MRI on 6/15/18 showed no liver masses or sheryl dil.    She was last week here to start LT eval. She vomited blood while she was here getting labs and returned to [place], unbeknownst to us. She was hospitalized 8/27-9/1/18 for hematemesis (PHG, grade 1 varices), anemia (hgb 5.6),  hemorrhagic shock. CARLYN (creat 3), HE. She had a previous episode of hematemesis in 2018. EGD at that time showed small varices. She was banded in the past: 2018.    Until this summer she was very active. No restrictions. No SOB or CP. Now she is weaker but performs all of her own ADLs except gets help putting on socks.    Portal vein assessment: US 18 and MRI 6/15/18 patent  Diabetes: no  Abdominal surgery: appy, tubal    HBV neg   HIV neg     PAST MEDICAL HISTORY  HTN  Bell's palsy  Appy   Tubal ligation  SOCIAL HISTORY  Single. Lives with SO  Currently is not working. Has not worked since  after her diagnosis with liver disease. Worked as a . On disability for liver disease for several years.  Smoking: Yes. Smokes 1-2 cig/d now for about 5-7 years. Was smoking about 1 ppd at some points. Started smoking at around [age].  Alcohol: last was about 3 years ago. Did not drink much. Drank 1-2 times per week at most.   FAMILY HISTORY  F alive age 81  M  63, DM, CKD, HTN  2 sisters. 1 with HBV   1 brother  ROS: 10 point ROS neg other than the symptoms noted above in the HPI.  EXAM    Gen: No acute distress. Frail. Seated in WC.  Head: Normocephalic atraumatic. Very poor dentition. Broken teeth, black teeth, missing teeth.  Eyes: Sclera anicteric  Neck: No cervical lymphadenopathy  Respiratory: Clear to auscultation bilaterally, no overt wheezing or rales  CV: RRR . Loud M RUSB.  Abdomen:  Soft, nontender, mildly distended, normal bowel sounds  Extrem: No edema  Skin: Jaundiced. No spider angiomata or palmar erythema.  No rashes. Multiple bruises on arms and back.   Neuro: Alert and oriented. No asterixis.    MSK: Moderate-severe muscle wasting.  Psych: Normal speech, normal affect

## 2018-09-04 NOTE — MR AVS SNAPSHOT
After Visit Summary   9/4/2018    Jessica Hallman    MRN: [id number]           Patient Information     Date Of Birth          1960        Visit Information        Provider Department      9/4/2018 10:00 AM Jacqui Kaye MD [facility] Hepatology        Today's Diagnoses     Hepatic cirrhosis due to chronic hepatitis C infection (H)    -  1       Follow-ups after your visit        Your next 10 appointments already scheduled     Sep 04, 2018  1:00 PM CDT   Transplant Class-Liver with UC TRANSPLANT CLASS   [facility] Solid Organ Transplant ([facility])    [address] [place] [address]  [address]  [facility] [id number] [phone]            Sep 04, 2018  2:30 PM CDT   (Arrive by 2:15 PM)   New Patient Visit with Annie Hall MD   [facility] Heart [facility] ([facility])    [address] [place] [address]  [address]  [facility] [address] [phone]            Sep 21, 2018  1:40 PM CDT   (Arrive by 1:25 PM)   New Patient Visit with Paulette Dunlap MD   [facility] ([facility])    [address] [place] [address]  [address]  [facility] [id number] [phone]              Future tests that were ordered for you today     Open Future Orders        Priority Expected Expires Ordered    Echocardiogram Complete Routine  8/9/2019 8/9/2018            Who to contact     If you have questions or need follow up information about today's clinic visit or your schedule please contact [facility] HEPATOLOGY directly at [phone].  Normal or non-critical lab and imaging results will be communicated to you by MyChart, letter or phone within 4 business days after the clinic has received the results. If you do not hear from us within 7 days, please contact the clinic through MyChart or phone. If you have a critical or abnormal lab result, we will notify you by phone as soon as possible.  Submit refill requests  "through [employer] or call your pharmacy and they will forward the refill request to us. Please allow 3 business days for your refill to be completed.          Additional Information About Your Visit        [employer] Information     [employer] lets you send messages to your doctor, view your test results, renew your prescriptions, schedule appointments and more. To sign up, go to [email or web address] . Click on \"Log in\" on the left side of the screen, which will take you to the Welcome page. Then click on \"Sign up Now\" on the right side of the page.     You will be asked to enter the access code listed below, as well as some personal information. Please follow the directions to create your username and password.     Your access code is: [email or web address]  Expires: 2018  6:52 AM     Your access code will  in 90 days. If you need help or a new code, please call your [place] clinic or [phone].        Care EveryWhere ID     This is your Care EveryWhere ID. This could be used by other organizations to access your [place] medical records  [id number]         Blood Pressure from Last 3 Encounters:   No data found for BP    Weight from Last 3 Encounters:   18 59 kg (130 lb)              Today, you had the following     No orders found for display       Primary Care Provider Office Phone # Fax #    Mateo Rodriguez [phone]       [facility] [address]   [place] [address]        Equal Access to Services     VIRGIL BOYCE : Hadii aad ku hadasho Soomaali, waaxda luqadaha, qaybta kaalmada adeegyada, steve camacho . So [facility] [place] [phone].    ATENCIÓN: Si habla español, tiene a hernadez disposición servicios gratuitos de asistencia lingüística. Llame al [phone].    We comply with applicable federal civil rights laws and [place] laws. We do not discriminate on the basis of race, color, national origin, age, disability, sex, sexual orientation, or " gender identity.            Thank you!     Thank you for choosing [facility] HEPATOLOGY  for your care. Our goal is always to provide you with excellent care. Hearing back from our patients is one way we can continue to improve our services. Please take a few minutes to complete the written survey that you may receive in the mail after your visit with us. Thank you!             Your Updated Medication List - Protect others around you: Learn how to safely use, store and throw away your medicines at www.disposemymeds.org.          This list is accurate as of 9/4/18 12:54 PM.  Always use your most recent med list.                   Brand Name Dispense Instructions for use Diagnosis    B COMPLETE Tabs      Take 1 tablet by mouth See Admin Instructions        lactulose encephalopathy 10 GM/15ML SOLUTION    CHRONULAC     Take 10 g by mouth See Admin Instructions        Magnesium Oxide 500 MG Tabs      Take 500 mg by mouth daily        nadolol 80 MG tablet    CORGARD     Take 80 mg by mouth daily Taking every other day per pt        pantoprazole 40 MG EC tablet    PROTONIX     Take 40 mg by mouth 2 times daily        vitamin D3 400 units Chew      Take 400 Units by mouth See Admin Instructions

## 2018-09-04 NOTE — MR AVS SNAPSHOT
After Visit Summary   9/4/2018    Jessica Hallman    MRN: [id number]           Patient Information     Date Of Birth          1960        Visit Information        Provider Department      9/4/2018 2:30 PM Annie Hall MD [facility]        Today's Diagnoses     Pre-op exam    -  1    Aortic stenosis          Care Instructions    Patient Instructions:  It was a pleasure to see you in the cardiology clinic today.      If you have any questions, call  Jackie Roberto RN, at [phone].  Press Option #1 for the [facility], and then press Option #3 for nursing.  We are encouraging the use of [employer] to communicate with your HealthCare Provider    Note the new medications: none  Stop the following medications: none    The results from today include: none  Please follow up with the TAVR clinic      If you have an urgent need after hours (8:00 am to 4:30 pm) please call [phone] and ask for the cardiology fellow on call.            Follow-ups after your visit        Additional Services     Follow-Up with TAVR Clinic                 Your next 10 appointments already scheduled     Sep 21, 2018  1:40 PM CDT   (Arrive by 1:25 PM)   New Patient Visit with Paulette Dunlap MD   [facility] ([facility])    [address] [place] [address] [phone]              Future tests that were ordered for you today     Open Future Orders        Priority Expected Expires Ordered    Follow-Up with TAVR Clinic Routine 9/11/2018 12/10/2018 9/4/2018    Echocardiogram Complete Routine  8/9/2019 8/9/2018            Who to contact     If you have questions or need follow up information about today's clinic visit or your schedule please contact [facility] directly at [phone].  Normal or non-critical lab and imaging results will be communicated to you by MyChart, letter or  "phone within 4 business days after the clinic has received the results. If you do not hear from us within 7 days, please contact the clinic through [employer] or phone. If you have a critical or abnormal lab result, we will notify you by phone as soon as possible.  Submit refill requests through [employer] or call your pharmacy and they will forward the refill request to us. Please allow 3 business days for your refill to be completed.          Additional Information About Your Visit        [employer] Information     [employer] lets you send messages to your doctor, view your test results, renew your prescriptions, schedule appointments and more. To sign up, go to [email or web address] . Click on \"Log in\" on the left side of the screen, which will take you to the Welcome page. Then click on \"Sign up Now\" on the right side of the page.     You will be asked to enter the access code listed below, as well as some personal information. Please follow the directions to create your username and password.     Your access code is: [email or web address]  Expires: 2018  6:52 AM     Your access code will  in 90 days. If you need help or a new code, please call your [place] clinic or [phone].        Care EveryWhere ID     This is your Care EveryWhere ID. This could be used by other organizations to access your [place] medical records  [id number]        Your Vitals Were     Pulse Height Pulse Oximetry BMI (Body Mass Index)          78 1.524 m (5') 100% 25.39 kg/m2         Blood Pressure from Last 3 Encounters:   18 138/81    Weight from Last 3 Encounters:   18 59 kg (130 lb)   18 59 kg (130 lb)              We Performed the Following     EKG 12-lead, tracing only (Same Day)        Primary Care Provider Office Phone # Fax #    Mateo ANA Rodriguez [phone]       [facility] [address]   [facility] [address]        Equal Access to Services     JUSTIN BOYCE AH: Ines guerrero " Ivan, uzair carbajal, mira kayumi raygoza, steve daliain hayaan rehanashankar alejandrojennifer laKatjafaisal francois. So [facility] [phone].    ATENCIÓN: Si subha collazo, tiene a hernadez disposición servicios gratuitos de asistencia lingüística. Florentino al [phone].    We comply with applicable federal civil rights laws and [place] laws. We do not discriminate on the basis of race, color, national origin, age, disability, sex, sexual orientation, or gender identity.            Thank you!     Thank you for choosing [facility]  for your care. Our goal is always to provide you with excellent care. Hearing back from our patients is one way we can continue to improve our services. Please take a few minutes to complete the written survey that you may receive in the mail after your visit with us. Thank you!             Your Updated Medication List - Protect others around you: Learn how to safely use, store and throw away your medicines at www.disposemymeds.org.          This list is accurate as of 9/4/18  2:46 PM.  Always use your most recent med list.                   Brand Name Dispense Instructions for use Diagnosis    acetaminophen 500 MG tablet    TYLENOL     Take 1,000 mg by mouth        B COMPLETE Tabs      Take 1 tablet by mouth See Admin Instructions        lactulose encephalopathy 10 GM/15ML SOLUTION    CHRONULAC     Take 10 g by mouth See Admin Instructions        Magnesium Oxide 500 MG Tabs      Take 500 mg by mouth daily        nadolol 80 MG tablet    CORGARD     Take 80 mg by mouth daily Taking every other day per pt        pantoprazole 40 MG EC tablet    PROTONIX     Take 40 mg by mouth 2 times daily        vitamin D3 400 units Chew      Take 400 Units by mouth See Admin Instructions

## 2018-09-04 NOTE — TELEPHONE ENCOUNTER
DATE:  9/4/2018   TIME OF RECEIPT FROM LAB:  10:55    LAB TEST:  Platelet  LAB VALUE:  40  RESULTS GIVEN WITH READ-BACK TO (PROVIDER):  CHUCK HIKCMAN  TIME LAB VALUE REPORTED TO PROVIDER:   10:55

## 2018-09-04 NOTE — PROGRESS NOTES
I am delighted to see Jessica Hallman in consultation for cardiovascular evaluation for possible liver transplant.     History of Present Illness:  As you know, the patient is a 57 year old  Female who is accompanied today by her boyfriend. She has a h/o Hepatitis C in remission after treatment. However she has had progressive liver cirrhosis thought to be due to alcohol although patient denies drinking. She had previously been evaluated at [facility] for liver transplant but due to insurance reasons she is referred to [facility]. As part of a liver transplant evaluation, an echo was done on 8/2/18 which revealed aortic stenosis. She was scheduled for a dobutamine stress echo by transplant team which was cancelled by the stress echo team due to severe aortic stenosis seen on rest echo.     Currently she is in a wheelchair and says she has been weak since her last discharge from [facility] a few weeks ago due to esophageal varices bleed. Prior to that she is able to move about the house, do some light gardening, can walk about 2 blocks. No chest pain, no dizziness, no syncope, no orthopnea. She has dyspnea only when her ascites progress. She had never been told she had a heart issue, and never had an echocardiogram before 8/2/18.       The following portions of the patient's history were reviewed and updated as appropriate: allergies, current medications, past family history, past medical history, past social history, past surgical history, and the problem list.      Past Medical History:  Hepatitis C, treated  Liver cirrhosis ?alcohol vs Hep C-related  Ascites - receives regular paracentesis  Esophageal varices  Tubal ligation  Appendectomy  Ongoing tobacco use      Medications:   Corgard 80 every other day  Protonix 40 bid  MgOx  Vitamins    (Spironolactone - hypotension/dizzy)      Allergies:    Allergies   Allergen Reactions     Latex Other (See Comments)     Latex score of 6.     No Clinical Screening - See  Comments Hives     Pollen Extract      Other reaction(s): Other (see comments)  Hayfever causes itchy eyes, runny nose [per patient]     Nickel Rash     Soap Rash       Family History: mother with CAD 70s    Psychosocial history:  reports that she has been smoking.  She has never used smokeless tobacco. Ongoing tobacco 1-2/day. She denies drinking currently.    Review of systems:   Cardiovascular: No palpitations, chest pain, shortness of breath at rest, dyspnea with exertion, orthopnea, paroxysmal nocturia dyspnea, nocturia, dizziness, syncope.    In addition,   Constitutional: No change in weight, sleep or appetite.  Normal energy.  No fever or chills  Eyes: Negative for vision changes or eye problems  ENT: No problems with ears, nose or throat.  No difficulty swallowing.  Resp: No coughing, wheezing or shortness of breath  GI:  nausea, vomiting a few weeks ago resulting in UGI bleeding; positive for abdominal pain with ascites, no diarrhea, constipation or change in bowel habits  : No urinary frequency or dysuria, bladder or kidney problems  Musculoskeletal: No significant muscle or joint pains  Neurologic: No headaches, numbness, tingling, weakness, problems with balance or coordination  Psychiatric: No problems with anxiety, depression or mental health  Heme/immune/allergy: No history of bleeding or clotting problems or anemia.  No allergies or immune system problems  Integumentary: No rashes,worrisome lesions or skin problems      Physical examination  Vitals: /81 (BP Location: Right arm, Patient Position: Chair, Cuff Size: Adult Regular)  Pulse 78  Ht 1.524 m (5')  Wt 59 kg (130 lb)  SpO2 100%  BMI 25.39 kg/m2  BMI= Body mass index is 25.39 kg/(m^2).    Constitutional: In general, the patient is in no distress; she is sitting in a wheelchair, jaundiced, cachectic-appearing  Eyes: PERRLA.  EOMI.  Sclerae white, not injected.  ENT/mouth: Normiocephalic and atraumatic.  Nares clear.  Pharynx without  erythema or exudate.  Dentition intact.  No adenopathy.  No thyromegaly. Carotids +2/2 bilaterally.  No jugular venous distension.   Card/Vasc: The PMI is in the 5th ICS in the midclavicular line. There is no heave. Regular rate and rhythm. Normal S1, S2. Loud systolic murmur with radiation to neck bilaterally, no rub, click, or gallop. Pulses are normal bilaterally throughout. 3+ nonpittig peripheral edema.  Respiratory: scattered inspiratory wheezes.  No ronchi, wheezes, rales.  No dullness to percussion.   GI: Abdomen is soft, nontender, mildly distended. No organomegaly. No AAA.  No bruits.   Integument: No significant bruises or rashes  Neurological: The neurological examination reveal a patient who was oriented to person, place, and time.    Psych: Normal  Heme/Lymph/Immun: no significant adenopathy      I have reviewed the following labs/imaging:  Labs 9/4/18: K 4.2, cr 0.93 (2.82 on 8/27/18), hgb 9.8, plt 40K, INR 2.89  Echo today 9/4/18: EF > 70%, moderate to severe aortic stenosis, DENA 1.0cm2; mean gradient 37mmHg.  Concentric LVH, early diastolic dysfunction. RVSP 23mmHg, IVC normal size, no pericardial effusion      I have reviewed records from [facility]. Relevant findings are:  Echo done 8/2/19 but no results available    I have personally and independently reviewed the following:  EKG today 9/4/18: sinus, normal intervals      Assessment :  1. Aortic stenosis. Moderate to severe by resting echo. No symptoms of heart failure, syncope, angina. Will not be able to better define her aortic valve with JOHNATHAN given esophageal varices. Consider cardiac MR/MRA, she is willing and is not claustrophobic. She will be at high risk for cardiovascular complications for liver transplant. She is at high risk for surgical AVR. Will ask our TAVR team to evaluate to see if anything can or needs to be done.  2. End stage liver cirrhosis. Platelets are 40K, INR 2.89. High risk for any cardiovascular  procedures.        Plan:  Discussed dilemma with patient. Will send her for TAVR evaluation. She is currently at high cardiovascular risk for liver transplant.        I spent a total of 40 minutes face to face with  Jessica Hallman during today's office visit. Over 50% of this time was spent counseling the patient and/or coordinating care regarding management of her aortic stenosis.        The patient is to return as needed pending TAVR evaluation. The patient understood the treatment plan as outlined above.  There were no barriers to learning.      Annie Hall MD

## 2018-09-04 NOTE — PROGRESS NOTES
Outpatient MNT: Liver Transplant Evaluation    Current BMI: 25.4 (HT 60 in,  lbs/59 kg)  BMI is within criteria of <40 for liver transplant     Time Spent: 15 minutes  Visit Type: Initial  Referring Physician: Dr Rice  Pt accompanied by: her boyfriend, Ajay     History of previous txp: none     Nutrition Assessment  Pt and boyfriend met with an RD at the [facility] 2 months ago. She educated them on Na+ <2000 mg/day, fiber at 30 g/day, and protein 65-80 grams/day. Since then, they have made significant changes, mostly in reducing sodium intake by avoiding processed foods. They no longer add salt when they cook. Pt eats small/frequent meals to avoid filling up too much.     Appetite: variable     Vitamins, Supplements, Pertinent Meds: none   Herbal Medicines/Supplements: none     Diet Recall  Typical foods consumed: Greek yogurt, 1 protein bar/day or every other day, fruit, cereal with whole milk, toast with LS Jif and jelly, 4 oz protein/day (chicken, fish, etc). Beverages include water, cranberry juice, and Jose D. A few core power protein shakes/week. Dining out 1x/week d/t high volume of medical appointments.     Physical Activity  Being evaluated by PT/OT  Walking at home     Anthropometrics  Height:   60 in   BMI:    25.4    Weight Status:Overweight BMI 25-29.9   Weight:  130 lbs            IBW (lb): 100  % IBW: 130    Wt Hx: Pt reports + edema and + ascites. She reports lowest dry weight recently of 117 lbs, with weight fluctuating into 140s. Prior UBW (5 years ago) of 110 lbs.     Adj/dosing BW: 108 lbs/49 kg       Frailty Screening   Weakness Meets criteria for frailty if  strength (average of 3 trials, dominant hand) is:    Men  ?29 kg for BMI ?24  ?30 kg for BMI 24.1-26  ?30 kg for BMI 26.1-28  ?32 kg for BMI >28 Women  ?17 kg for BMI ?23  ?17.3 kg for BMI 23.1-26  ?18 kg for BMI 26.1-29  ?21 kg for BMI >29    Equipment: [place] hand dynamometer  Participant attempts to squeeze the  dynamometer maximally 3 times with the dominant hand.   Average of 3 trials: 12 kg with BMI of 25.4  Meets criteria for frailty based on handgrip strength: yes     Labs  No results for input(s): CHOL, HDL, LDL, TRIG, CHOLHDLRATIO in the last 95812 hours.    Malnutrition  % Intake: No decreased intake noted  % Weight Loss: None noted  Subcutaneous Fat Loss: None  Muscle Loss: Moderate  Fluid Accumulation/Edema: Moderate   Malnutrition Diagnosis: Non-Severe malnutrition in the context of chronic illness    Estimated Nutrition Needs  Energy  6326-7222    (25-30 kcal/kg for maintenance)   Protein  59-74    (1.2-1.5 g/kg for increased needs)         Fluid  1 ml/kcal or per MD        Micronutrient   Na+: <2000 mg/day            Nutrition Diagnosis  Food and nutrition related knowledge deficit r/t pre liver transplant eval AEB pt verbalized not hearing pre/post transplant diet guidelines.    Nutrition Intervention  Nutrition education provided:  Discussed sodium intake (low sodium foods and drinks, seasoning food without salt and tips for low sodium diet).    Reviewed adequate protein intake. Encouraged receiving protein from both animal and plant based sources. Encouraged consistency with protein supplements (bar or shake daily would be ideal).     Reviewed post txp diet guidelines in brief (will review in further detail post txp):  (1) Review of proper food safety measures d/t immunosuppressant therapy post-op and increased risk for food-borne illness    (2) Avoid the following post txp d/t risk for rejection, unknown effects on the organs, and/or potential interactions with immunosuppressants:  - Herbal, Chinese, holistic, chiropractic, natural, alternative medicines and supplements  - Detoxes and cleanses  - Weight loss pills  - Protein powders or other products with extracts or herbs (ie green tea extract)    (3) Med regimen and possible side effects    Patient Understanding: Pt verbalized understanding of education  provided.  Expected Compliance: Good  Follow-Up Plans: PRN     Nutrition Goals  1. Limit Na+ <2000mg/day  2. Pt to verbalize understanding of 3 aspects of post txp education provided  3. 1 protein bar or shake daily consistently     Provided pt with contact info.   Aure Palumbo RD, LD  [facility] [phone]

## 2018-09-04 NOTE — PATIENT INSTRUCTIONS
Patient Instructions:  It was a pleasure to see you in the cardiology clinic today.      If you have any questions, call  Jackie Roberto RN, at [phone].  Press Option #1 for the [facility], and then press Option #3 for nursing.  We are encouraging the use of [employer] to communicate with your HealthCare Provider    Note the new medications: none  Stop the following medications: none    The results from today include: none  Please follow up with the TAVR clinic      If you have an urgent need after hours (8:00 am to 4:30 pm) please call [phone] and ask for the cardiology fellow on call.

## 2018-09-04 NOTE — LETTER
9/4/2018       RE: Jessica Hallman  [address]  [facility] [address]     Dear Colleague,    Thank you for referring your patient, Jessica Hallman, to the [facility] HEPATOLOGY at [facility]. Please see a copy of my visit note below.    [facility] Liver Transplant Evaluation    Requesting Provider and Health System: Dr. Nolberto Christensen, [facility]  Liver Disease: HCV cirrhosis    A/P  57 year old female with HCV cirrhosis. MELD 33 ABO A    CIRRHOSIS. 2/2 HCV and questions of ETOH but no definitive testing documenting alcohol use. Patient denies. HCV cleared.  Synthetic function significantly impaired with MELD 33. Decompensation: EV, ascites and HE    ASCITES. Intolerant to diuretics. Para done yesterday  CARLYN. Creat was 2.8 last week, now normalized to 0.86 after volume resuscitation.  THROMBOCYTOPENIA. 2/2 cirrhosis  HCC SCREENING.  UTCD with US last week. No masses  VARICEAL SCREENING. EGD done last week while on OSH. Grade 1 varices    ELEVATED Ca19-9. MRI unremarkable.    AORTIC STENOSIS. Valve 0.93 cm2. Severe. Reports no symptoms Unlikely candidate for surgery. Will preclude LT. Will send to cardiology for assessment.    DENTITION. Very poor dentition. Has not seen dentist in years.    SOCIAL SUPPORT. Support system with family here today  FUNCTIONAL STATUS. Very frail  LIVER TRANSPLANT CANDIDACY. With severe AS, would not be a candidate. Other issues of concern are history of noncompliance per outside records.    Pending cardiology assessment will decide if she will return her care to [facility] or here.     Jacqui Kaye MD  Hepatology/Liver Transplantation  Medical Director, Liver Transplantation  [facility]  ========================================================================    Subjective:  57 y F HCV cirrhosis. MELD 26, ABO A. Diagnosed in about 2013 when she was not feeling well and subsequently vomited blood. She was seen at [facility] by Dr. Christensen whom they describe as a good friend.    She is here with her SO Ajay, son Ayden and daughter.    MELD-Na score: 33 at 8/27/2018  8:04 AM  MELD score: 33 at 8/27/2018  8:04 AM  Calculated from:  Serum Creatinine: 2.82 mg/dL at 8/27/2018  8:04 AM  Serum Sodium: 138 mmol/L (Rounded to 137) at 8/27/2018  8:04 AM  Total Bilirubin: 4.6 mg/dL at 8/27/2018  8:04 AM  INR(ratio): 2.62 at 8/27/2018  8:04 AM  Age: 57 years     MELD-Na score: 26 at 9/4/2018 10:11 AM  MELD score: 26 at 9/4/2018 10:11 AM  Calculated from:  Serum Creatinine: 0.93 mg/dL (Rounded to 1) at 9/4/2018 10:11 AM  Serum Sodium: 144 mmol/L (Rounded to 137) at 9/4/2018 10:11 AM  Total Bilirubin: 7.2 mg/dL at 9/4/2018 10:11 AM  INR(ratio): 2.89 at 9/4/2018 10:11 AM  Age: 57 years    -ascites, para q 1-3 weeks. Did not tolerate diuretics 2/2 lightheadedness  -HE on lactulose. 2-10 BM per day. Doesn't adjust with BM. 4 days of the week she has more than 5 BM. SO states she needs 2 doses daily or she gets confused.  -EV/GIB    HCV GT 1a treated and cured about 5 years ago  Concern regarding ETOH use per outside records but patient and family adamantly disagree with this.    She was seen 2013-16 by Dr. Christensen, then no visits until February 2018.     February 2018 she developed abdominal distention. Saw Dr. Christensen in [place]. He thought the picture in February was c/w ETOH hepatitis but she repeatedly denied ETOH use. I note her MCV was 103 at that time. AST was 96, ALT 32, Tbili 8, WBC 10. Over the next severl months she continued to decline. She last saw Dr. Christensen 8/2/18. His note states that she was not getting [facility] LT eval 2/2 lack of financial coverage with [facility], but she was also found to have severe AS (valve area 0.93 cm2) and he commented on concerns about her lack of compliance (note 7/16/18 by Dr. Christensen). He also expressed concerns for her social support.    I note her Ca19-9 was 292 on 7/30/18. AFP 3.1. MRI on 6/15/18  showed no liver masses or sheryl dil.    She was last week here to start LT eval. She vomited blood while she was here getting labs and returned to [place], unbeknownst to us. She was hospitalized -18 for hematemesis (PHG, grade 1 varices), anemia (hgb 5.6), hemorrhagic shock. CARLYN (creat 3), HE. She had a previous episode of hematemesis in 2018. EGD at that time showed small varices. She was banded in the past: 2018.    Until this summer she was very active. No restrictions. No SOB or CP. Now she is weaker but performs all of her own ADLs except gets help putting on socks.    Portal vein assessment: US 18 and MRI 6/15/18 patent  Diabetes: no  Abdominal surgery: appy, tubal    HBV neg   HIV neg     PAST MEDICAL HISTORY  HTN  Bell's palsy  Appy   Tubal ligation  SOCIAL HISTORY  Single. Lives with SO  Currently is not working. Has not worked since  after her diagnosis with liver disease. Worked as a . On disability for liver disease for several years.  Smoking: Yes. Smokes 1-2 cig/d now for about 5-7 years. Was smoking about 1 ppd at some points. Started smoking at around [age].  Alcohol: last was about 3 years ago. Did not drink much. Drank 1-2 times per week at most.   FAMILY HISTORY  F alive age 81  M  63, DM, CKD, HTN  2 sisters. 1 with HBV   1 brother  ROS: 10 point ROS neg other than the symptoms noted above in the HPI.  EXAM    Gen: No acute distress. Frail. Seated in WC.  Head: Normocephalic atraumatic. Very poor dentition. Broken teeth, black teeth, missing teeth.  Eyes: Sclera anicteric  Neck: No cervical lymphadenopathy  Respiratory: Clear to auscultation bilaterally, no overt wheezing or rales  CV: RRR . Loud M RUSB.  Abdomen:  Soft, nontender, mildly distended, normal bowel sounds  Extrem: No edema  Skin: Jaundiced. No spider angiomata or palmar erythema.  No rashes. Multiple bruises on arms and back.   Neuro: Alert and oriented. No asterixis.    MSK:  Moderate-severe muscle wasting.  Psych: Normal speech, normal affect          Again, thank you for allowing me to participate in the care of your patient.      Sincerely,    Jacqui Kaye MD

## 2018-09-04 NOTE — MR AVS SNAPSHOT
After Visit Summary   9/4/2018    Jessica Hallman    MRN: [id number]           Patient Information     Date Of Birth          1960        Visit Information        Provider Department      9/4/2018 12:00 PM Jasvir Chavez, MELINDA [facility] Solid Organ Transplant        Today's Diagnoses     Awaiting organ transplant status    -  1       Follow-ups after your visit        Your next 10 appointments already scheduled     Sep 21, 2018  1:40 PM CDT   (Arrive by 1:25 PM)   New Patient Visit with Paulette Dunlap MD   [facility] ([facility])    [address] [place] [address]  [address] [place] [id number]   [phone]            Oct 04, 2018  1:30 PM CDT   Nurse Visit with  Cvc Nurse   [facility] ([facility])    [address] [place] [address]  [address] [place] [id number] [phone]            Oct 04, 2018  2:00 PM CDT   (Arrive by 1:45 PM)   New Patient Visit with  CVC VALVE CLINIC   [facility] ([facility])    [address] [place] [address]  [address] [place] [id number] [phone]            Oct 04, 2018  2:00 PM CDT   (Arrive by 1:45 PM)   TAVR New with Slick Houston MD   [facility] ([facility])    [address] [place] [address]  [address] [place] [id number] [phone]              Future tests that were ordered for you today     Open Future Orders        Priority Expected Expires Ordered    Follow-Up with TAVR Clinic Routine 9/11/2018 12/10/2018 9/4/2018    Echocardiogram Complete Routine  8/9/2019 8/9/2018            Who to contact     If you have questions or need follow up information about today's clinic visit or your schedule please contact [facility] SOLID ORGAN TRANSPLANT directly at [phone].  Normal or non-critical lab and imaging results will be communicated to you by MyChart, letter or phone within 4  "business days after the clinic has received the results. If you do not hear from us within 7 days, please contact the clinic through [employer] or phone. If you have a critical or abnormal lab result, we will notify you by phone as soon as possible.  Submit refill requests through [employer] or call your pharmacy and they will forward the refill request to us. Please allow 3 business days for your refill to be completed.          Additional Information About Your Visit        [employer] Information     [employer] lets you send messages to your doctor, view your test results, renew your prescriptions, schedule appointments and more. To sign up, go to [email or web address] . Click on \"Log in\" on the left side of the screen, which will take you to the Welcome page. Then click on \"Sign up Now\" on the right side of the page.     You will be asked to enter the access code listed below, as well as some personal information. Please follow the directions to create your username and password.     Your access code is: [email or web address]  Expires: 2018  6:52 AM     Your access code will  in 90 days. If you need help or a new code, please call your [place] clinic or [phone].        Care EveryWhere ID     This is your Care EveryWhere ID. This could be used by other organizations to access your [place] medical records  [id number]         Blood Pressure from Last 3 Encounters:   18 138/81    Weight from Last 3 Encounters:   18 59 kg (130 lb)   18 59 kg (130 lb)              Today, you had the following     No orders found for display       Primary Care Provider Office Phone # Fax #    Mateo PEREZ [facility] [phone]       [facility] [address]   [facility] [address]        Equal Access to Services     JUSTIN BOYCE : Ines Love, wamary carbajal, qaybta kaalmaguillermo raygoza, steve parrish. So [facility] [phone].    ATENCIÓN: Si habla " español, tiene a hernadez disposición servicios gratuitos de asistencia lingüística. Florentino parker [phone].    We comply with applicable federal civil rights laws and [place] laws. We do not discriminate on the basis of race, color, national origin, age, disability, sex, sexual orientation, or gender identity.            Thank you!     Thank you for choosing [facility] SOLID ORGAN TRANSPLANT  for your care. Our goal is always to provide you with excellent care. Hearing back from our patients is one way we can continue to improve our services. Please take a few minutes to complete the written survey that you may receive in the mail after your visit with us. Thank you!             Your Updated Medication List - Protect others around you: Learn how to safely use, store and throw away your medicines at www.disposemymeds.org.          This list is accurate as of 9/4/18 11:59 PM.  Always use your most recent med list.                   Brand Name Dispense Instructions for use Diagnosis    acetaminophen 500 MG tablet    TYLENOL     Take 1,000 mg by mouth        B COMPLETE Tabs      Take 1 tablet by mouth See Admin Instructions        lactulose encephalopathy 10 GM/15ML SOLUTION    CHRONULAC     Take 10 g by mouth See Admin Instructions        Magnesium Oxide 500 MG Tabs      Take 500 mg by mouth daily        nadolol 80 MG tablet    CORGARD     Take 80 mg by mouth daily Taking every other day per pt        pantoprazole 40 MG EC tablet    PROTONIX     Take 40 mg by mouth 2 times daily        vitamin D3 400 units Chew      Take 400 Units by mouth See Admin Instructions

## 2018-09-04 NOTE — MR AVS SNAPSHOT
After Visit Summary   9/4/2018    Jessica Hallman    MRN: [id number]           Patient Information     Date Of Birth          1960        Visit Information        Provider Department      9/4/2018 11:30 AM Esthela Palumbo RD [facility] Solid Organ Transplant        Today's Diagnoses     Hepatitis C    -  1       Follow-ups after your visit        Your next 10 appointments already scheduled     Sep 04, 2018  2:30 PM CDT   (Arrive by 2:15 PM)   New Patient Visit with Annie Hall MD   [facility] ([facility])    [address] [place] [address]  [address] [place] [id number] [phone]            Sep 21, 2018  1:40 PM CDT   (Arrive by 1:25 PM)   New Patient Visit with Paulette Dunlap MD   [facility] ([facility])    [address] [place] [address]  [address] [place] [id number] [phone]              Future tests that were ordered for you today     Open Future Orders        Priority Expected Expires Ordered    Echocardiogram Complete Routine  8/9/2019 8/9/2018            Who to contact     If you have questions or need follow up information about today's clinic visit or your schedule please contact [facility] SOLID ORGAN TRANSPLANT directly at [phone].  Normal or non-critical lab and imaging results will be communicated to you by [employer], letter or phone within 4 business days after the clinic has received the results. If you do not hear from us within 7 days, please contact the clinic through [employer] or phone. If you have a critical or abnormal lab result, we will notify you by phone as soon as possible.  Submit refill requests through [employer] or call your pharmacy and they will forward the refill request to us. Please allow 3 business days for your refill to be completed.          Additional Information About Your Visit        [employer] Information     [employer] lets you send messages to your  "doctor, view your test results, renew your prescriptions, schedule appointments and more. To sign up, go to [email or web address] . Click on \"Log in\" on the left side of the screen, which will take you to the Welcome page. Then click on \"Sign up Now\" on the right side of the page.     You will be asked to enter the access code listed below, as well as some personal information. Please follow the directions to create your username and password.     Your access code is: [email or web address]  Expires: 2018  6:52 AM     Your access code will  in 90 days. If you need help or a new code, please call your [place] clinic or [phone].        Care EveryWhere ID     This is your Care EveryWhere ID. This could be used by other organizations to access your [place] medical records  [id number]         Blood Pressure from Last 3 Encounters:   No data found for BP    Weight from Last 3 Encounters:   18 59 kg (130 lb)              Today, you had the following     No orders found for display       Primary Care Provider Office Phone # Fax #    Mateo PEREZ [facility] [phone]       [facility] [address] [facility] [address]   [place] [id number]        Equal Access to Services     VIRGIL BOYCE : Hadii aad ku hadasho Soomaali, waaxda luqadaha, qaybta kaalmada adeegyada, waxay idiin hayelinn lucy de los santos laandrea . So [facility] [phone].    ATENCIÓN: Si habla español, tiene a hernadez disposición servicios gratuitos de asistencia lingüística. Llame al [phone].    We comply with applicable federal civil rights laws and [place] laws. We do not discriminate on the basis of race, color, national origin, age, disability, sex, sexual orientation, or gender identity.            Thank you!     Thank you for choosing [facility] SOLID ORGAN TRANSPLANT  for your care. Our goal is always to provide you with excellent care. Hearing back from our patients is one way we can continue to improve our services. Please take a few " minutes to complete the written survey that you may receive in the mail after your visit with us. Thank you!             Your Updated Medication List - Protect others around you: Learn how to safely use, store and throw away your medicines at www.disposemymeds.org.          This list is accurate as of 9/4/18  2:08 PM.  Always use your most recent med list.                   Brand Name Dispense Instructions for use Diagnosis    acetaminophen 500 MG tablet    TYLENOL     Take 1,000 mg by mouth        B COMPLETE Tabs      Take 1 tablet by mouth See Admin Instructions        lactulose encephalopathy 10 GM/15ML SOLUTION    CHRONULAC     Take 10 g by mouth See Admin Instructions        Magnesium Oxide 500 MG Tabs      Take 500 mg by mouth daily        nadolol 80 MG tablet    CORGARD     Take 80 mg by mouth daily Taking every other day per pt        pantoprazole 40 MG EC tablet    PROTONIX     Take 40 mg by mouth 2 times daily        vitamin D3 400 units Chew      Take 400 Units by mouth See Admin Instructions

## 2018-09-04 NOTE — LETTER
9/4/2018    RE: Jessica Hallman  [address]  [facility] [address]     Dear Colleague,    Thank you for the opportunity to participate in the care of your patient, Jessica Hallman, at the [facility] Please see a copy of my visit note below.    I am delighted to see Jessica Hallman in consultation for cardiovascular evaluation for possible liver transplant.     History of Present Illness:  As you know, the patient is a 57 year old  Female who is accompanied today by her boyfriend. She has a h/o Hepatitis C in remission after treatment. However she has had progressive liver cirrhosis thought to be due to alcohol although patient denies drinking. She had previously been evaluated at [facility] for liver transplant but due to insurance reasons she is referred to [facility]. As part of a liver transplant evaluation, an echo was done on 8/2/18 which revealed aortic stenosis. She was scheduled for a dobutamine stress echo by transplant team which was cancelled by the stress echo team due to severe aortic stenosis seen on rest echo.     Currently she is in a wheelchair and says she has been weak since her last discharge from [facility] a few weeks ago due to esophageal varices bleed. Prior to that she is able to move about the house, do some light gardening, can walk about 2 blocks. No chest pain, no dizziness, no syncope, no orthopnea. She has dyspnea only when her ascites progress. She had never been told she had a heart issue, and never had an echocardiogram before 8/2/18.       The following portions of the patient's history were reviewed and updated as appropriate: allergies, current medications, past family history, past medical history, past social history, past surgical history, and the problem list.      Past Medical History:  Hepatitis C, treated  Liver cirrhosis ?alcohol vs Hep C-related  Ascites - receives regular paracentesis  Esophageal  varices  Tubal ligation  Appendectomy  Ongoing tobacco use      Medications:   Corgard 80 every other day  Protonix 40 bid  MgOx  Vitamins    (Spironolactone - hypotension/dizzy)      Allergies:    Allergies   Allergen Reactions     Latex Other (See Comments)     Latex score of 6.     No Clinical Screening - See Comments Hives     Pollen Extract      Other reaction(s): Other (see comments)  Hayfever causes itchy eyes, runny nose [per patient]     Nickel Rash     Soap Rash       Family History: mother with CAD 70s    Psychosocial history:  reports that she has been smoking.  She has never used smokeless tobacco. Ongoing tobacco 1-2/day. She denies drinking currently.    Review of systems:   Cardiovascular: No palpitations, chest pain, shortness of breath at rest, dyspnea with exertion, orthopnea, paroxysmal nocturia dyspnea, nocturia, dizziness, syncope.    In addition,   Constitutional: No change in weight, sleep or appetite.  Normal energy.  No fever or chills  Eyes: Negative for vision changes or eye problems  ENT: No problems with ears, nose or throat.  No difficulty swallowing.  Resp: No coughing, wheezing or shortness of breath  GI:  nausea, vomiting a few weeks ago resulting in UGI bleeding; positive for abdominal pain with ascites, no diarrhea, constipation or change in bowel habits  : No urinary frequency or dysuria, bladder or kidney problems  Musculoskeletal: No significant muscle or joint pains  Neurologic: No headaches, numbness, tingling, weakness, problems with balance or coordination  Psychiatric: No problems with anxiety, depression or mental health  Heme/immune/allergy: No history of bleeding or clotting problems or anemia.  No allergies or immune system problems  Integumentary: No rashes,worrisome lesions or skin problems      Physical examination  Vitals: /81 (BP Location: Right arm, Patient Position: Chair, Cuff Size: Adult Regular)  Pulse 78  Ht 1.524 m (5')  Wt 59 kg (130 lb)  SpO2  100%  BMI 25.39 kg/m2  BMI= Body mass index is 25.39 kg/(m^2).    Constitutional: In general, the patient is in no distress; she is sitting in a wheelchair, jaundiced, cachectic-appearing  Eyes: PERRLA.  EOMI.  Sclerae white, not injected.  ENT/mouth: Normiocephalic and atraumatic.  Nares clear.  Pharynx without erythema or exudate.  Dentition intact.  No adenopathy.  No thyromegaly. Carotids +2/2 bilaterally.  No jugular venous distension.   Card/Vasc: The PMI is in the 5th ICS in the midclavicular line. There is no heave. Regular rate and rhythm. Normal S1, S2. Loud systolic murmur with radiation to neck bilaterally, no rub, click, or gallop. Pulses are normal bilaterally throughout. 3+ nonpittig peripheral edema.  Respiratory: scattered inspiratory wheezes.  No ronchi, wheezes, rales.  No dullness to percussion.   GI: Abdomen is soft, nontender, mildly distended. No organomegaly. No AAA.  No bruits.   Integument: No significant bruises or rashes  Neurological: The neurological examination reveal a patient who was oriented to person, place, and time.    Psych: Normal  Heme/Lymph/Immun: no significant adenopathy      I have reviewed the following labs/imaging:  Labs 9/4/18: K 4.2, cr 0.93 (2.82 on 8/27/18), hgb 9.8, plt 40K, INR 2.89  Echo today 9/4/18: EF > 70%, moderate to severe aortic stenosis, DENA 1.0cm2; mean gradient 37mmHg.  Concentric LVH, early diastolic dysfunction. RVSP 23mmHg, IVC normal size, no pericardial effusion      I have reviewed records from [facility]. Relevant findings are:  Echo done 8/2/19 but no results available    I have personally and independently reviewed the following:  EKG today 9/4/18: sinus, normal intervals      Assessment :  1. Aortic stenosis. Moderate to severe by resting echo. No symptoms of heart failure, syncope, angina. Will not be able to better define her aortic valve with JOHNATHAN given esophageal varices. Consider cardiac MR/MRA, she is willing and is not  claustrophobic. She will be at high risk for cardiovascular complications for liver transplant. She is at high risk for surgical AVR. Will ask our TAVR team to evaluate to see if anything can or needs to be done.  2. End stage liver cirrhosis. Platelets are 40K, INR 2.89. High risk for any cardiovascular procedures.        Plan:  Discussed dilemma with patient. Will send her for TAVR evaluation. She is currently at high cardiovascular risk for liver transplant.        I spent a total of 40 minutes face to face with  Jessica Hallman during today's office visit. Over 50% of this time was spent counseling the patient and/or coordinating care regarding management of her aortic stenosis.        The patient is to return as needed pending TAVR evaluation. The patient understood the treatment plan as outlined above.  There were no barriers to learning.      Annie Hall MD

## 2018-09-04 NOTE — NURSING NOTE
Chief Complaint   Patient presents with     New Patient     57 year old female h/o hepatitis C, liver cirrhosis, tobacco abuse, GI bleed present for pre-liver evaluation     Medications reviewed and vitals and EKG performed.   Shelly Camilo CMA

## 2018-09-04 NOTE — COMMITTEE REVIEW
Abdominal Committee Review Note     Evaluation Date: 9/4/2018  Committee Review Date: 9/4/2018    Organ being evaluated for: Liver    Transplant Phase: Evaluation  Transplant Status: Active    Transplant Coordinator: Camila Clements  Transplant Surgeon:       Referring Physician: Mateo Rodriguez    Primary Diagnosis: Cirrhosis: Type C  Secondary Diagnosis:     Committee Review Members:  Nutrition Aure Palumbo, PAO   Pharmacist Maury Zhao, [facility]    - Clinical Jasvir Chavez, [facility]   Transplant Nuria Tavarez MD, Nolberto Camargo MD, Jr Barrett Nichols RN, Jacqui Kaye MD, Camila Clements, FRANCESCO, Brayden Silva MD, Ricci Salas MD, Letty Hennessy, FRANCESCO, Beltran Rice MD, Bhargav Howe MD, Dalton Hobbs MD       Transplant Eligibility: HCV, Cirrhosis with MELD    Committee Review Decision:     Relative Contraindications:     Absolute Contraindications: None, severe aorotic stenosis    Committee Chair Jacqui Kaye MD verbally attested to the committee's decision.    Committee Discussion Details:   Not a candidate due to severe Aortic stenosis

## 2018-09-05 NOTE — PROGRESS NOTES
"Transplant Social Work Services Progress Note      Collaborated with: Liver Transplant Team  Data: Jessica was seen by Dr. Kyae today and she was told she would not be referred for liver transplant evaluation because of her severe aortic stenosis.  Intervention: I met with Jessica, her significant other Ajay, son Ayden and his girlfriend Tamela and completed a brief assessment and provided supportive counseling.  Assessment: Jessica lives with her significant other jAay in [place], [place].  Ajay reports [facility] will be visiting tomorrow to open Jessica to home care.  Jessica has Medicare and Medical Assistance.  She could be referred for additional in-home services as needed.  Jessica has strong support from her significant other Ajay and her sons, Ayden and Kadeem, who live nearby.  Ajay already assists Jessica with her mediation management, personal cares and transportation to medical appointments. Jessica uses a walker and wheelchair.  She is frail and very limited in her ambulation.  Jessica denies any alcohol use in over two years, though she could not give a definite time period.  She denies any history of alcohol abuse/dependency, and this will not be assessment further since patient is not being referred for a transplant evaluation.  Jessica denies any history of pain medication abuse or illicit drug use.  She declined completing the PHQ-9 and BRITTANY-7 today. Jessica is appropriately feeling \"down\" about her medical diagnoses and lack of treatment options.    Education provided by JACKI: Role of Palliative Care and Clinic availability, Skilled Home Care Services and Hospice  Plan: I provided my contact should Jessica or her support system have any follow up questions.  I encouraged them to contact me as needed.         MELINDA Diaz, [facility]  Liver Transplant   Phone [phone]  Pager [phone]   "

## 2018-09-05 NOTE — PROGRESS NOTES
Date: 9/5/2018    Time of Call: 11:49 AM     Diagnosis:  Severe aortic stenosis     [ TORB ] Ordering provider: Rancho Michael MD  Order:   TAVR CT  Carotid US  Angiogram scheduling order  CMP and CBC     Order received by: Simi Mendoza RN     Follow-up/additional notes:   Referred to Valve clinic by Dr. Annie Hall    Sister Ngoc was called s the pt was not available.  Information on the program, as well as contact information was shared.  Jessica will be scheduled for appropriate imaging appointments.

## 2018-09-11 NOTE — TELEPHONE ENCOUNTER
FUTURE VISIT INFORMATION      FUTURE VISIT INFORMATION:    Date: 9.21.18     Time: 1:40 Pm    Location: [facility]  REFERRAL INFORMATION:    Referring provider:  Dr. Kaye    Referring providers clinic:  Hepatology Clinic    Reason for visit/diagnosis  surgery eval    RECORDS REQUESTED FROM:       Clinic name Comments Records Status Imaging Status   Hepatology Clinic Referral placed 8.3.18 EPIC                                    RECORDS STATUS

## 2018-09-11 NOTE — TELEPHONE ENCOUNTER
FUTURE VISIT INFORMATION      FUTURE VISIT INFORMATION:    Date: 10/4/18     Time: 1:15 PM    Location: [facility] - Cardiology   REFERRAL INFORMATION:    Referring provider:  Dr. Annie Hall     Referring providers clinic:  [facility] - Cardiology    Reason for visit/diagnosis  Severe aortic stenosis       RECORDS STATUS      NOTES STATUS DETAILS   OFFICE NOTE from referring provider (last 1 yr) Internal 9/4/18   OFFICE NOTE from other specialist N/A    DISCHARGE SUMMARY from hospital N/A    DISCHARGE REPORT from the ER Care Everywhere 9/3/18, 8/16/18,   CARDIAC OPERATIVE REPORT N/A    MEDICATION LIST Internal    IMAGING     CAROTID ULTRASOUND (REPORT & IMAGES) Internal 10/4/18   CORONARY ANGIOGRAM (REPORT & IMAGES) N/A Not scheduled/completed   CT ANGIOGRAM CHEST/ABD/PEL (TAVR) (REPORT & IMAGES) Internal 10/4/18   ECHO (JOHNATHAN OR TTE) (REPORT & IMAGES) Internal, Received 9/4/18, 8/2/18   PFT RESULTS Internal 8/27/18

## 2018-10-04 NOTE — PROGRESS NOTES
[facility] HEART CARE  CARDIOVASCULAR DIVISION    VALVE CLINIC INITIAL CONSULTATION    PRIMARY CARDIOLOGIST: Dr. Annie Hall      PERTINENT CLINICAL HISTORY:     Jessica Hallman is a very pleasant 57 year old female with severe aortic valvular stenosis referred to our clinic for evaluation and consideration for potential transcatheter aortic valve replacement (TAVR).  her severe aortic stenosis is characterized with an area of 1.0 cm2, mean gradient 37 mmHg and peak velocity of 4.1 m/sec with LVEF >70% by echocardiogram on 9/4/18.  Additional notable medical history of cirrhosis of the liver, HCV, esophageal varices, current smoker was being assessed by Dr Hall for preoperative evaluation for liver transplant evaluation. She underwent echocardiography as part of the evaluation which showed moderate aortic stenosis with an DENA of 1.0, peak velocity of 4.1 m/s, mean pressure of 37 mmHg, DI of 0.35 and an LVOT of 1.8 mm by echo. She in not very active and does not have dyspnea or shortness of breath. Her daily exercise routine involves walking one or two blocks.     Her MELD Na score is 33, her last INR was 2.89 and platelets of 40K.         PAST MEDICAL HISTORY:     Past Medical History:   Diagnosis Date     Aortic stenosis      Ascites      HCV (hepatitis C virus)     SVR     Hemorrhagic shock and encephalopathy syndrome (H)      Hepatic encephalopathy (H)      Murmur      Osteopenia 07/2018     Tubular adenoma 01/22/2014    2 tubular adenomas, 1 sessile serrated adenoma        PAST SURGICAL HISTORY:     Past Surgical History:   Procedure Laterality Date     LAPAROSCOPIC APPENDECTOMY          CURRENT MEDICATIONS:     Current Outpatient Prescriptions   Medication Sig Dispense Refill     acetaminophen (TYLENOL) 500 MG tablet Take 1,000 mg by mouth       B Complex-Biotin-FA (B COMPLETE) TABS Take 1 tablet by mouth See Admin Instructions       Cholecalciferol (VITAMIN D3) 400 units CHEW Take 400 Units by mouth See  Admin Instructions       lactulose encephalopathy (CHRONULAC) 10 GM/15ML SOLUTION Take 10 g by mouth See Admin Instructions       Magnesium Oxide 500 MG TABS Take 500 mg by mouth daily       nadolol (CORGARD) 80 MG tablet Take 80 mg by mouth daily Taking every other day per pt       pantoprazole (PROTONIX) 40 MG EC tablet Take 40 mg by mouth 2 times daily          ALLERGIES:     Allergies   Allergen Reactions     Latex Other (See Comments)     Latex score of 6.     No Clinical Screening - See Comments Hives     Pollen Extract      Other reaction(s): Other (see comments)  Hayfever causes itchy eyes, runny nose [per patient]     Nickel Rash     Soap Rash        FAMILY HISTORY:   No family history on file.     SOCIAL HISTORY:     Social History     Social History     Marital status: Single     Spouse name: N/A     Number of children: N/A     Years of education: N/A     Social History Main Topics     Smoking status: Current Every Day Smoker     Smokeless tobacco: Never Used     Alcohol use Not on file     Drug use: Not on file     Sexual activity: Not on file     Other Topics Concern     Not on file     Social History Narrative        REVIEW OF SYSTEMS:     Constitutional: No fevers or chills  Skin: No new rash or itching  Eyes: No acute change in vision  Ears/Nose/Throat: No purulent rhinorrhea, new hearing loss, or new vertigo  Respiratory: No cough or hemoptysis  Cardiovascular: See HPI  Gastrointestinal: No change in appetite, vomiting, hematemesis or diarrhea  Genitourinary: No dysuria or hematuria  Musculoskeletal: No new back pain, neck pain or muscle pain  Neurologic: No new headaches, focal weakness or behavior changes  Psychiatric: No hallucinations, excessive alcohol consumption or illegal drug usage  Hematologic/Lymphatic/Immunologic: No bleeding, chills, fever, night sweats or weight loss  Endocrine: No new cold intolerance, heat intolerance, polyphagia, polydipsia or polyuria      PHYSICAL EXAMINATION:      There were no vitals taken for this visit.  /78 (BP Location: Right arm, Patient Position: Chair, Cuff Size: Adult Regular)  Pulse 65  Ht 1.524 m (5')  Wt 49.7 kg (109 lb 8 oz)  SpO2 100%  BMI 21.39 kg/m2    GENERAL: No acute distress.  HEENT: scleral icterus, jaundice  Neck: No adenopathy. No thyromegaly. Symmetrical.   Heart: Regular rate and rhythm. Harsh crescendo decrescendo mid peaking systolic murmur with radiation to carotids. Delayed carotid pulses.   Lungs: Clear to auscultation. No ronchi, wheezes, rales.   Abdomen: Soft, nontender, nondistended. Bowel sounds present.  Extremities: No clubbing, cyanosis, there is bilateral pitting 1+ edema  Neurologic: Alert and oriented to person/place/time, normal speech and affect. No focal deficits.  Skin: jaundice     LABORATORY DATA:     LIPID RESULTS:  Lab Results   Component Value Date    CHOL 72 07/30/2018    HDL 15 (L) 07/30/2018    LDL 46 07/30/2018    TRIG 53 07/30/2018       LIVER ENZYME RESULTS:  Lab Results   Component Value Date    AST 72 (H) 10/04/2018    ALT 38 10/04/2018       CBC RESULTS:  Lab Results   Component Value Date    WBC 6.1 10/04/2018    RBC 2.74 (L) 10/04/2018    HGB 9.5 (L) 10/04/2018    HCT 28.4 (L) 10/04/2018     (H) 10/04/2018    MCH 34.7 (H) 10/04/2018    MCHC 33.5 10/04/2018    RDW Dimorphic population - unable to calculate 10/04/2018    PLT 54 (L) 10/04/2018       BMP RESULTS:  Lab Results   Component Value Date     10/04/2018    POTASSIUM 3.5 10/04/2018    CHLORIDE 107 10/04/2018    CO2 19 (L) 10/04/2018    ANIONGAP 10 10/04/2018     (H) 10/04/2018    BUN 34 (H) 10/04/2018    CR 1.85 (H) 10/04/2018    GFRESTIMATED 28 (L) 10/04/2018    GFRESTBLACK 34 (L) 10/04/2018    ADELA 8.8 10/04/2018        A1C RESULTS:  Lab Results   Component Value Date    A1C 4.2 07/30/2018       INR RESULTS:  Lab Results   Component Value Date    INR 2.89 (H) 09/04/2018    INR 2.62 (H) 08/27/2018          PROCEDURES & FURTHER  ASSESSMENTS:     ECG: sinus    Echocardiogram: 9/4/18  Interpretation Summary  Dobutamine stress echo was aborted due to significant aortic valve stenosis.     Left ventricular systolic function is hyperkinetic with an EF >70%.  Right ventricular function, chamber size, wall motion, and thickness are  normal.  Moderate to severe aortic stenosis. The peak aortic velocity is 4.1 m/sec.  The inferior vena cava was normal in size with preserved respiratory  variability.     Previous study not available for comparison.    CT TAVR: pending, CaSc 1400    Coronary Angiogram and Right Heart Catheterization: pending    PFTs: 8/27/18  The FEV1 and FVC are reduced but the FEV1/FVC ratio is normal.  While the vital capacity and total lung capacity are within normal limits, the RV/TLC ratio is increased.  This may suggest air-trapping.  The diffusing capacity is moderately reduced.    However, the diffusing capacity was not corrected for the patient's hemoglobin.  IMPRESSION:  Non-specific reduction in FEV1 and FVC but ATS criteria not met, interpret with caution  Normal lung volumes  Moderate Diffusion Defect    Carotid Ultrasound: pending      STS RISK SCORE: 2.9  FRAILTY SCORE: Pending  NYHA CLASS: III     CLINICAL IMPRESSION:     Jessica Hallman is a 57 year old female with severe aortic stenosis with an overall surgical mortality risk estimation of 2.9% based on the STS score. However, she has a number of comorbidities including liver cirrhosis with a MELD of 33. Her high output state also brings the severity of her aortic valve into question. Based on her last echocardiogram, the valve appears to be opening, it is calcified, and the LVOT measures at 1.8 cm which is quite small and could potentially underestimate the DENA along with a DI of 0.35. As a result, we will plan for an invasive measurement of her aortic valve area with a left heart catheterization at the time of her coronary angiography and right heart  catheterization.    Plan Summary:  1) Severe Aortic Stenosis:  -left heart catheterization with aortic valve study for DENA, right heart catheterization, coronary angiography  -Presentation of data to the Heart team to assess the optimal treatment of her severe aortic stenosis    Patient was evaluated in clinic with Dr. Michael of interventional cardiology and Dr. Houston of cardiothoracic surgery.      Herve Humphries  Interventional Cardiology Fellow  [phone]    CC  Patient Care Team:  Mateo Rodriguez as PCP - General (Internal Medicine)  [address]        Patient seen and examined with Cardiovascular fellow and agree with the assessment and plan described above.     Rancho Michael M.D.  Interventional Cardiology  [facility]

## 2018-10-04 NOTE — PATIENT INSTRUCTIONS
You were seen today in the Cardiovascular Clinic at the [facility].      Cardiology Providers you saw during your visit:  Dr. Michael and Dr. Houston    Recommendations:  Please have a coronary angiogram.    You are scheduled for a Coronary Angiogram at the [facility], [address] [place] [address], [place] [address]. You are to check in at the [facility] at 6:30am on October 23 with Dr. Ferguson    Pre procedure instructions:  1. Please make arrangements to have a  as you will not be allowed to drive following the procedure.  2. Do not eat or drink after midnight the day of your procedure.  3. You may take your usual morning medications with a sip of water the morning of your procedure.  4. Make arrangements to have someone stay with you the night after your procedure.    Please call me if you have questions regarding these instructions or your scheduled procedure.    Follow-up: Based upon results of your angiogram and right heart cath.      Thank you for your visit today!     Sandra Villegas RN  Structural Heart Care Coordinator   TAVR, MitraClip and Watchman Programs  [facility]  Office: [phone]    Elizabeth Mendoza RN  Structural Heart Care Coordinator   TAVR, MitraClip and Watchman Programs  [facility]  Office: [phone]    Clinics and Surgery Center  [address] [place] [address]  Cardiology Clinic [facility] [address] [place] [id number]

## 2018-10-04 NOTE — MR AVS SNAPSHOT
After Visit Summary   10/4/2018    Jessica Hallman    MRN: [id number]           Patient Information     Date Of Birth          1960        Visit Information        Provider Department      10/4/2018 1:45 PM  CV VALVE CLINIC [facility]        Today's Diagnoses     Aortic valve stenosis, etiology of cardiac valve disease unspecified        Severe aortic stenosis          Care Instructions    You were seen today in the Cardiovascular Clinic at the [facility].      Cardiology Providers you saw during your visit:  Dr. Michael and Dr. Houston    Recommendations:  Please have a coronary angiogram.    You are scheduled for a Coronary Angiogram at the [facility], [address] [place] [address]. You are to check in at the [facility] at 6:30am on October 23 with Dr. Ferguson    Pre procedure instructions:  1. Please make arrangements to have a  as you will not be allowed to drive following the procedure.  2. Do not eat or drink after midnight the day of your procedure.  3. You may take your usual morning medications with a sip of water the morning of your procedure.  4. Make arrangements to have someone stay with you the night after your procedure.      Please call me if you have questions regarding these instructions or your scheduled procedure.    Follow-up: Based upon results of your angiogram and right heart cath.      Thank you for your visit today!     Sandra Villegas RN  Structural Heart Care Coordinator   TAVR, MitraClip and Watchman Programs  [facility]  Office: [phone]    Elizabeth Mendoza RN  Structural Heart Care Coordinator   TAVR, MitraClip and Watchman Programs  [facility]  Office: [phone]    Clinics and Surgery Center  [address] [place] [address]  Cardiology Clinic [address] [address]                  Follow-ups after your visit         Your next 10 appointments already scheduled     Dec 05, 2018  1:30 PM CST   FULL PULMONARY FUNCTION with UC PFL A   [facility] Pulmonary Function Testing ([facility])    [address]  3rd Floor  [facility] [address] [phone]            Dec 05, 2018  2:30 PM CST   XR CHEST 2 VIEWS with UCXR1   [facility] Imaging Center Xray ([facility])    [address]  1st Floor  [facility] [id number] [phone]           How do I prepare for my exam? (Food and drink instructions) No Food and Drink Restrictions.  How do I prepare for my exam? (Other instructions) You do not need to do anything special for this exam.  What should I wear: Wear comfortable clothes.  How long does the exam take: Most scans take less than 5 minutes.  What should I bring: Bring a list of your medicines, including vitamins, minerals and over-the-counter drugs. It is safest to leave personal items at home.  Do I need a :  No  is needed.  What do I need to tell my doctor: Tell your doctor if there s any chance you are pregnant.  What should I do after the exam: No restrictions, You may resume normal activities.  What is this test: An image of a specific body part shown in shades of black and white.  Who should I call with questions: If you have any questions, please call the Imaging Department where you will have your exam. Directions, parking instructions, and other information is available on our website, [email or web address].            Dec 05, 2018  2:55 PM CST   (Arrive by 2:40 PM)   New Patient Visit with Catrachito Faria MD   [facility] ([facility] [place])    [address]  [address]  [facility] [id number] [phone]              Who to contact     If you have questions or need follow up information about today's clinic visit or your schedule please contact [facility] directly at  [phone].  Normal or non-critical lab and imaging results will be communicated to you by MyChart, letter or phone within 4 business days after the clinic has received the results. If you do not hear from us within 7 days, please contact the clinic through MyChart or phone. If you have a critical or abnormal lab result, we will notify you by phone as soon as possible.  Submit refill requests through [employer]hart or call your pharmacy and they will forward the refill request to us. Please allow 3 business days for your refill to be completed.          Additional Information About Your Visit        Care EveryWhere ID     This is your Care EveryWhere ID. This could be used by other organizations to access your [place] medical records  [id number]        Your Vitals Were     Pulse Height Pulse Oximetry BMI (Body Mass Index)          65 1.524 m (5') 100% 21.39 kg/m2         Blood Pressure from Last 3 Encounters:   10/04/18 122/78   10/04/18 122/78   09/04/18 138/81    Weight from Last 3 Encounters:   10/04/18 49.8 kg (109 lb 12.8 oz)   10/04/18 49.7 kg (109 lb 8 oz)   09/04/18 59 kg (130 lb)              We Performed the Following     EKG 12-lead, tracing only     Follow-Up with TAVR Clinic        Primary Care Provider Office Phone # Fax #    Mateo Rodriguez [phone]       [facility] [address]   [facility] [address]        Equal Access to Services     [facility] CenterCINDY AH: Hadii aad ku hadasho Soomaali, waaxda luqadaha, qaybta kaalmada adeegyada, steve velazco hayelinn adeshankar de los santos la'aan . So [facility] [phone].    ATENCIÓN: Si habla español, tiene a hernadez disposición servicios gratuitos de asistencia lingüística. Florentino al [phone].    We comply with applicable federal civil rights laws and [place] laws. We do not discriminate on the basis of race, color, national origin, age, disability, sex, sexual orientation, or gender identity.            Thank you!     Thank you for choosing [facility]  for your care. Our goal is always to provide you with excellent care. Hearing back from our patients is one way we can continue to improve our services. Please take a few minutes to complete the written survey that you may receive in the mail after your visit with us. Thank you!             Your Updated Medication List - Protect others around you: Learn how to safely use, store and throw away your medicines at www.disposemymeds.org.          This list is accurate as of 10/4/18  2:44 PM.  Always use your most recent med list.                   Brand Name Dispense Instructions for use Diagnosis    acetaminophen 500 MG tablet    TYLENOL     Take 1,000 mg by mouth        B COMPLETE Tabs      Take 1 tablet by mouth See Admin Instructions        lactulose encephalopathy 10 GM/15ML SOLUTION    CHRONULAC     Take 10 g by mouth See Admin Instructions        Magnesium Oxide 500 MG Tabs      Take 500 mg by mouth daily        nadolol 80 MG tablet    CORGARD     Take 80 mg by mouth daily Taking every other day per pt        pantoprazole 40 MG EC tablet    PROTONIX     Take 40 mg by mouth 2 times daily        vitamin D3 400 units Chew      Take 400 Units by mouth See Admin Instructions

## 2018-10-04 NOTE — PROGRESS NOTES
CARDIOTHORACIC VALVE SURGERY CONSULT    Referring Provider: Annie Hall  PCP:  FANI GOODEN [phone]  Asked by Annie Hall to see this patient in consultation for surgical treatment vs TAVR.    HPI: Jessica Hallman is a very pleasant 57 year old female with severe aortic valvular stenosis referred to our clinic for evaluation and consideration for potential transcatheter aortic valve replacement (TAVR).  her severe aortic stenosis is characterized with an area of 1.0 cm2, mean gradient 37 mmHg and peak velocity of 4.1 m/sec with LVEF >70% by echocardiogram on 9/4/18.  Additional notable medical history of cirrhosis of the liver, HCV, esophageal varices, current smoker was being assessed by Dr Hall for preoperative evaluation for liver transplant evaluation. She underwent echocardiography as part of the evaluation which showed moderate aortic stenosis with an DENA of 1.0, peak velocity of 4.1 m/s, mean pressure of 37 mmHg, DI of 0.35 and an LVOT of 1.8 mm by echo. She in not very active and does not have dyspnea or shortness of breath. Her daily exercise routine involves walking one or two blocks.      Her MELD Na score is 33, her last INR was 2.89 and platelets of 40K.    PAST MEDICAL HISTORY:  Past Medical History:   Diagnosis Date     Aortic stenosis      Ascites      HCV (hepatitis C virus)     SVR     Hemorrhagic shock and encephalopathy syndrome (H)      Hepatic encephalopathy (H)      Murmur      Osteopenia 07/2018     Tubular adenoma 01/22/2014    2 tubular adenomas, 1 sessile serrated adenoma       CURRENT MEDICATIONS:  Current Outpatient Prescriptions   Medication Sig Dispense Refill     acetaminophen (TYLENOL) 500 MG tablet Take 1,000 mg by mouth       B Complex-Biotin-FA (B COMPLETE) TABS Take 1 tablet by mouth See Admin Instructions       Cholecalciferol (VITAMIN D3) 400 units CHEW Take 400 Units by mouth See Admin Instructions       lactulose encephalopathy (CHRONULAC) 10 GM/15ML SOLUTION Take 10 g  by mouth See Admin Instructions       Magnesium Oxide 500 MG TABS Take 500 mg by mouth daily       nadolol (CORGARD) 80 MG tablet Take 80 mg by mouth daily Taking every other day per pt       pantoprazole (PROTONIX) 40 MG EC tablet Take 40 mg by mouth 2 times daily         PAST SURGICAL HISTORY:  Past Surgical History:   Procedure Laterality Date     LAPAROSCOPIC APPENDECTOMY         ALLERGIES     Allergies   Allergen Reactions     Latex Other (See Comments)     Latex score of 6.     No Clinical Screening - See Comments Hives     Pollen Extract      Other reaction(s): Other (see comments)  Hayfever causes itchy eyes, runny nose [per patient]     Nickel Rash     Soap Rash       FAMILY HISTORY:  No family history on file.    SOCIAL HISTORY:  Social History     Social History     Marital status: Single     Spouse name: N/A     Number of children: N/A     Years of education: N/A     Social History Main Topics     Smoking status: Current Every Day Smoker     Smokeless tobacco: Never Used      Comment: 3 cigs      Alcohol use Not on file     Drug use: Not on file     Sexual activity: Not on file     Other Topics Concern     Not on file     Social History Narrative       ROS:   Constitutional: No fever, chills, or sweats. No weight gain/loss   ENT: No visual disturbance, ear ache, epistaxis, sore throat  Allergies/Immunologic: Negative.   Respiratory: No cough, hemoptysia  Cardiovascular: As per HPI  GI: No nausea, vomiting, hematemesis, melena, or hematochezia  : No urinary frequency, dysuria, or hematuria. Denies liver or kidney problems.   Heme/Onc: denies bleeding or clotting disorders, no family problems with  Integument: Negative  Psychiatric: Negative  Neuro: Negative  Endocrinology: Negative   Musculoskeletal: Negative  Neuro: denies depression, memory problems, no dysesthesias, no previous strokes, no migraines, no dysphagia  Skin: No petechiae, purpura or rash.      EXAM:  /78 (BP Location: Right arm,  Patient Position: Chair, Cuff Size: Adult Regular)  Pulse 65  Ht 1.524 m (5')  Wt 49.8 kg (109 lb 12.8 oz)  BMI 21.44 kg/m2  In general, the patient is a pleasant female in no apparent distress.    Heart: RRR. Normal S1, S2 splits physiologically. There is systolic murmur; no rub, click, or gallop. The PMI is in the 5th ICS in the midclavicular line. There is no heave.    HEENT: NC/AT.  PERRLA.  EOMI.  Sclerae white, not injected.  Nares clear.  Pharynx without erythema or exudate.  Dentition intact.    Neck: No adenopathy.  No thyromegaly. Carotids +4/4 bilaterally without bruits.  No jugular venous distension.   Lungs: CTA.  No ronchi, wheezes, rales.  No dullness to percussion.   Abdomen: Soft, nontender, nondistended. No organomegaly.  No bruits.   Extremities: No clubbing, cyanosis, or edema.  The pulses are +4/4 at the radial, brachial, femoral, popliteal, DP, and PT sites bilaterally.  No bruits are noted.  Neurologic: Alert and oriented to person/place/time, normal speech, gait and affect  Skin: No petechiae, purpura or rash.    Labs:  LIPID RESULTS:  Lab Results   Component Value Date    CHOL 72 07/30/2018    HDL 15 (L) 07/30/2018    LDL 46 07/30/2018    TRIG 53 07/30/2018       LIVER ENZYME RESULTS:  Lab Results   Component Value Date    AST 72 (H) 10/04/2018    ALT 38 10/04/2018       CBC RESULTS:  Lab Results   Component Value Date    WBC 6.1 10/04/2018    RBC 2.74 (L) 10/04/2018    HGB 9.5 (L) 10/04/2018    HCT 28.4 (L) 10/04/2018     (H) 10/04/2018    MCH 34.7 (H) 10/04/2018    MCHC 33.5 10/04/2018    RDW Dimorphic population - unable to calculate 10/04/2018    PLT 54 (L) 10/04/2018       BMP RESULTS:  Lab Results   Component Value Date     10/04/2018    POTASSIUM 3.5 10/04/2018    CHLORIDE 107 10/04/2018    CO2 19 (L) 10/04/2018    ANIONGAP 10 10/04/2018     (H) 10/04/2018    BUN 34 (H) 10/04/2018    CR 1.85 (H) 10/04/2018    GFRESTIMATED 28 (L) 10/04/2018    GFRESTBLACK 34 (L)  10/04/2018    ADELA 8.8 10/04/2018        RESULTS:  Lab Results   Component Value Date    INR 2.89 (H) 09/04/2018    INR 2.62 (H) 08/27/2018     Lab Results   Component Value Date    A1C 4.2 07/30/2018     No results found for: PTT  No results found for: UA    Assessment:    Jessica Hallman is a 57 year old female with severe aortic stenosis with an overall surgical mortality risk estimation of 2.9% based on the STS score. However, she has a number of comorbidities including liver cirrhosis with a MELD of 33. Her high output state also brings the severity of her aortic valve into question. Based on her last echocardiogram, the valve appears to be opening, it is calcified, and the LVOT measures at 1.8 cm which is quite small and could potentially underestimate the DENA along with a DI of 0.35. As a result, we will plan for an invasive measurement of her aortic valve area with a left heart catheterization at the time of her coronary angiography and right heart catheterization.     Because of her comorbidities including liver cirrhosis with a MELD of 33 she is not a surgical candidate of AVR.    Plan Summary:  1) Severe Aortic Stenosis:  -left heart catheterization with aortic valve study for DENA, right heart catheterization, coronary angiography  -Presentation of data to the Heart team to assess the optimal treatment of her severe aortic stenosisCC  Patient Care Team:  Mateo Rodriguez as PCP - General (Internal Medicine)  [address]

## 2018-10-04 NOTE — LETTER
10/4/2018      RE: Jessica Hallman  [address]  [place] [address]       Dear Colleague,    Thank you for the opportunity to participate in the care of your patient, Jessica Hallman, at the [facility]. Please see a copy of my visit note below.    CARDIOTHORACIC VALVE SURGERY CONSULT    Referring Provider: Annie Hall  PCP:  FANI GOODEN [phone]  Asked by Annie Hall to see this patient in consultation for surgical treatment vs TAVR.    HPI: Jessica Hallman is a very pleasant 57 year old female with severe aortic valvular stenosis referred to our clinic for evaluation and consideration for potential transcatheter aortic valve replacement (TAVR).  her severe aortic stenosis is characterized with an area of 1.0 cm2, mean gradient 37 mmHg and peak velocity of 4.1 m/sec with LVEF >70% by echocardiogram on 9/4/18.  Additional notable medical history of cirrhosis of the liver, HCV, esophageal varices, current smoker was being assessed by Dr Hall for preoperative evaluation for liver transplant evaluation. She underwent echocardiography as part of the evaluation which showed moderate aortic stenosis with an DENA of 1.0, peak velocity of 4.1 m/s, mean pressure of 37 mmHg, DI of 0.35 and an LVOT of 1.8 mm by echo. She in not very active and does not have dyspnea or shortness of breath. Her daily exercise routine involves walking one or two blocks.      Her MELD Na score is 33, her last INR was 2.89 and platelets of 40K.    PAST MEDICAL HISTORY:  Past Medical History:   Diagnosis Date     Aortic stenosis      Ascites      HCV (hepatitis C virus)     SVR     Hemorrhagic shock and encephalopathy syndrome (H)      Hepatic encephalopathy (H)      Murmur      Osteopenia 07/2018     Tubular adenoma 01/22/2014    2 tubular adenomas, 1 sessile serrated adenoma       CURRENT MEDICATIONS:  Current Outpatient Prescriptions   Medication Sig Dispense Refill      acetaminophen (TYLENOL) 500 MG tablet Take 1,000 mg by mouth       B Complex-Biotin-FA (B COMPLETE) TABS Take 1 tablet by mouth See Admin Instructions       Cholecalciferol (VITAMIN D3) 400 units CHEW Take 400 Units by mouth See Admin Instructions       lactulose encephalopathy (CHRONULAC) 10 GM/15ML SOLUTION Take 10 g by mouth See Admin Instructions       Magnesium Oxide 500 MG TABS Take 500 mg by mouth daily       nadolol (CORGARD) 80 MG tablet Take 80 mg by mouth daily Taking every other day per pt       pantoprazole (PROTONIX) 40 MG EC tablet Take 40 mg by mouth 2 times daily         PAST SURGICAL HISTORY:  Past Surgical History:   Procedure Laterality Date     LAPAROSCOPIC APPENDECTOMY         ALLERGIES     Allergies   Allergen Reactions     Latex Other (See Comments)     Latex score of 6.     No Clinical Screening - See Comments Hives     Pollen Extract      Other reaction(s): Other (see comments)  Hayfever causes itchy eyes, runny nose [per patient]     Nickel Rash     Soap Rash       FAMILY HISTORY:  No family history on file.    SOCIAL HISTORY:  Social History     Social History     Marital status: Single     Spouse name: N/A     Number of children: N/A     Years of education: N/A     Social History Main Topics     Smoking status: Current Every Day Smoker     Smokeless tobacco: Never Used      Comment: 3 cigs      Alcohol use Not on file     Drug use: Not on file     Sexual activity: Not on file     Other Topics Concern     Not on file     Social History Narrative       ROS:   Constitutional: No fever, chills, or sweats. No weight gain/loss   ENT: No visual disturbance, ear ache, epistaxis, sore throat  Allergies/Immunologic: Negative.   Respiratory: No cough, hemoptysia  Cardiovascular: As per HPI  GI: No nausea, vomiting, hematemesis, melena, or hematochezia  : No urinary frequency, dysuria, or hematuria. Denies liver or kidney problems.   Heme/Onc: denies bleeding or clotting disorders, no family  problems with  Integument: Negative  Psychiatric: Negative  Neuro: Negative  Endocrinology: Negative   Musculoskeletal: Negative  Neuro: denies depression, memory problems, no dysesthesias, no previous strokes, no migraines, no dysphagia  Skin: No petechiae, purpura or rash.      EXAM:  /78 (BP Location: Right arm, Patient Position: Chair, Cuff Size: Adult Regular)  Pulse 65  Ht 1.524 m (5')  Wt 49.8 kg (109 lb 12.8 oz)  BMI 21.44 kg/m2  In general, the patient is a pleasant female in no apparent distress.    Heart: RRR. Normal S1, S2 splits physiologically. There is systolic murmur; no rub, click, or gallop. The PMI is in the 5th ICS in the midclavicular line. There is no heave.    HEENT: NC/AT.  PERRLA.  EOMI.  Sclerae white, not injected.  Nares clear.  Pharynx without erythema or exudate.  Dentition intact.    Neck: No adenopathy.  No thyromegaly. Carotids +4/4 bilaterally without bruits.  No jugular venous distension.   Lungs: CTA.  No ronchi, wheezes, rales.  No dullness to percussion.   Abdomen: Soft, nontender, nondistended. No organomegaly.  No bruits.   Extremities: No clubbing, cyanosis, or edema.  The pulses are +4/4 at the radial, brachial, femoral, popliteal, DP, and PT sites bilaterally.  No bruits are noted.  Neurologic: Alert and oriented to person/place/time, normal speech, gait and affect  Skin: No petechiae, purpura or rash.    Labs:  LIPID RESULTS:  Lab Results   Component Value Date    CHOL 72 07/30/2018    HDL 15 (L) 07/30/2018    LDL 46 07/30/2018    TRIG 53 07/30/2018       LIVER ENZYME RESULTS:  Lab Results   Component Value Date    AST 72 (H) 10/04/2018    ALT 38 10/04/2018       CBC RESULTS:  Lab Results   Component Value Date    WBC 6.1 10/04/2018    RBC 2.74 (L) 10/04/2018    HGB 9.5 (L) 10/04/2018    HCT 28.4 (L) 10/04/2018     (H) 10/04/2018    MCH 34.7 (H) 10/04/2018    MCHC 33.5 10/04/2018    RDW Dimorphic population - unable to calculate 10/04/2018    PLT 54 (L)  10/04/2018       BMP RESULTS:  Lab Results   Component Value Date     10/04/2018    POTASSIUM 3.5 10/04/2018    CHLORIDE 107 10/04/2018    CO2 19 (L) 10/04/2018    ANIONGAP 10 10/04/2018     (H) 10/04/2018    BUN 34 (H) 10/04/2018    CR 1.85 (H) 10/04/2018    GFRESTIMATED 28 (L) 10/04/2018    GFRESTBLACK 34 (L) 10/04/2018    ADELA 8.8 10/04/2018        RESULTS:  Lab Results   Component Value Date    INR 2.89 (H) 09/04/2018    INR 2.62 (H) 08/27/2018     Lab Results   Component Value Date    A1C 4.2 07/30/2018     No results found for: PTT  No results found for: UA    Assessment:    Jessica Hallman is a 57 year old female with severe aortic stenosis with an overall surgical mortality risk estimation of 2.9% based on the STS score. However, she has a number of comorbidities including liver cirrhosis with a MELD of 33. Her high output state also brings the severity of her aortic valve into question. Based on her last echocardiogram, the valve appears to be opening, it is calcified, and the LVOT measures at 1.8 cm which is quite small and could potentially underestimate the DENA along with a DI of 0.35. As a result, we will plan for an invasive measurement of her aortic valve area with a left heart catheterization at the time of her coronary angiography and right heart catheterization.     Because of her comorbidities including liver cirrhosis with a MELD of 33 she is not a surgical candidate of AVR.    Plan Summary:  1) Severe Aortic Stenosis:  -left heart catheterization with aortic valve study for DENA, right heart catheterization, coronary angiography  -Presentation of data to the Heart team to assess the optimal treatment of her severe aortic stenosisCC  Patient Care Team:  Mateo Rodriguez as PCP - General (Internal Medicine)  [address]IL[address] MD

## 2018-10-04 NOTE — LETTER
10/4/2018      RE: Jessica Hallman  [address]  [facility] [address]       Dear Colleague,    Thank you for the opportunity to participate in the care of your patient, Jessica Hallman, at the [facility] at [facility]. Please see a copy of my visit note below.    CARDIOTHORACIC VALVE SURGERY CONSULT    Referring Provider: Annie Hall  PCP:  FANI GOODEN [phone]  Asked by Annie Hall to see this patient in consultation for surgical treatment vs TAVR.    HPI: Jessica Hallman is a very pleasant 57 year old female with severe aortic valvular stenosis referred to our clinic for evaluation and consideration for potential transcatheter aortic valve replacement (TAVR).  her severe aortic stenosis is characterized with an area of 1.0 cm2, mean gradient 37 mmHg and peak velocity of 4.1 m/sec with LVEF >70% by echocardiogram on 9/4/18.  Additional notable medical history of cirrhosis of the liver, HCV, esophageal varices, current smoker was being assessed by Dr Hall for preoperative evaluation for liver transplant evaluation. She underwent echocardiography as part of the evaluation which showed moderate aortic stenosis with an DENA of 1.0, peak velocity of 4.1 m/s, mean pressure of 37 mmHg, DI of 0.35 and an LVOT of 1.8 mm by echo. She in not very active and does not have dyspnea or shortness of breath. Her daily exercise routine involves walking one or two blocks.      Her MELD Na score is 33, her last INR was 2.89 and platelets of 40K.    PAST MEDICAL HISTORY:  Past Medical History:   Diagnosis Date     Aortic stenosis      Ascites      HCV (hepatitis C virus)     SVR     Hemorrhagic shock and encephalopathy syndrome (H)      Hepatic encephalopathy (H)      Murmur      Osteopenia 07/2018     Tubular adenoma 01/22/2014    2 tubular adenomas, 1 sessile serrated adenoma       CURRENT MEDICATIONS:  Current Outpatient Prescriptions   Medication Sig Dispense Refill      acetaminophen (TYLENOL) 500 MG tablet Take 1,000 mg by mouth       B Complex-Biotin-FA (B COMPLETE) TABS Take 1 tablet by mouth See Admin Instructions       Cholecalciferol (VITAMIN D3) 400 units CHEW Take 400 Units by mouth See Admin Instructions       lactulose encephalopathy (CHRONULAC) 10 GM/15ML SOLUTION Take 10 g by mouth See Admin Instructions       Magnesium Oxide 500 MG TABS Take 500 mg by mouth daily       nadolol (CORGARD) 80 MG tablet Take 80 mg by mouth daily Taking every other day per pt       pantoprazole (PROTONIX) 40 MG EC tablet Take 40 mg by mouth 2 times daily         PAST SURGICAL HISTORY:  Past Surgical History:   Procedure Laterality Date     LAPAROSCOPIC APPENDECTOMY         ALLERGIES     Allergies   Allergen Reactions     Latex Other (See Comments)     Latex score of 6.     No Clinical Screening - See Comments Hives     Pollen Extract      Other reaction(s): Other (see comments)  Hayfever causes itchy eyes, runny nose [per patient]     Nickel Rash     Soap Rash       FAMILY HISTORY:  No family history on file.    SOCIAL HISTORY:  Social History     Social History     Marital status: Single     Spouse name: N/A     Number of children: N/A     Years of education: N/A     Social History Main Topics     Smoking status: Current Every Day Smoker     Smokeless tobacco: Never Used      Comment: 3 cigs      Alcohol use Not on file     Drug use: Not on file     Sexual activity: Not on file     Other Topics Concern     Not on file     Social History Narrative       ROS:   Constitutional: No fever, chills, or sweats. No weight gain/loss   ENT: No visual disturbance, ear ache, epistaxis, sore throat  Allergies/Immunologic: Negative.   Respiratory: No cough, hemoptysia  Cardiovascular: As per HPI  GI: No nausea, vomiting, hematemesis, melena, or hematochezia  : No urinary frequency, dysuria, or hematuria. Denies liver or kidney problems.   Heme/Onc: denies bleeding or clotting disorders, no family  problems with  Integument: Negative  Psychiatric: Negative  Neuro: Negative  Endocrinology: Negative   Musculoskeletal: Negative  Neuro: denies depression, memory problems, no dysesthesias, no previous strokes, no migraines, no dysphagia  Skin: No petechiae, purpura or rash.      EXAM:  /78 (BP Location: Right arm, Patient Position: Chair, Cuff Size: Adult Regular)  Pulse 65  Ht 1.524 m (5')  Wt 49.8 kg (109 lb 12.8 oz)  BMI 21.44 kg/m2  In general, the patient is a pleasant female in no apparent distress.    Heart: RRR. Normal S1, S2 splits physiologically. There is systolic murmur; no rub, click, or gallop. The PMI is in the 5th ICS in the midclavicular line. There is no heave.    HEENT: NC/AT.  PERRLA.  EOMI.  Sclerae white, not injected.  Nares clear.  Pharynx without erythema or exudate.  Dentition intact.    Neck: No adenopathy.  No thyromegaly. Carotids +4/4 bilaterally without bruits.  No jugular venous distension.   Lungs: CTA.  No ronchi, wheezes, rales.  No dullness to percussion.   Abdomen: Soft, nontender, nondistended. No organomegaly.  No bruits.   Extremities: No clubbing, cyanosis, or edema.  The pulses are +4/4 at the radial, brachial, femoral, popliteal, DP, and PT sites bilaterally.  No bruits are noted.  Neurologic: Alert and oriented to person/place/time, normal speech, gait and affect  Skin: No petechiae, purpura or rash.    Labs:  LIPID RESULTS:  Lab Results   Component Value Date    CHOL 72 07/30/2018    HDL 15 (L) 07/30/2018    LDL 46 07/30/2018    TRIG 53 07/30/2018       LIVER ENZYME RESULTS:  Lab Results   Component Value Date    AST 72 (H) 10/04/2018    ALT 38 10/04/2018       CBC RESULTS:  Lab Results   Component Value Date    WBC 6.1 10/04/2018    RBC 2.74 (L) 10/04/2018    HGB 9.5 (L) 10/04/2018    HCT 28.4 (L) 10/04/2018     (H) 10/04/2018    MCH 34.7 (H) 10/04/2018    MCHC 33.5 10/04/2018    RDW Dimorphic population - unable to calculate 10/04/2018    PLT 54 (L)  10/04/2018       BMP RESULTS:  Lab Results   Component Value Date     10/04/2018    POTASSIUM 3.5 10/04/2018    CHLORIDE 107 10/04/2018    CO2 19 (L) 10/04/2018    ANIONGAP 10 10/04/2018     (H) 10/04/2018    BUN 34 (H) 10/04/2018    CR 1.85 (H) 10/04/2018    GFRESTIMATED 28 (L) 10/04/2018    GFRESTBLACK 34 (L) 10/04/2018    ADELA 8.8 10/04/2018        RESULTS:  Lab Results   Component Value Date    INR 2.89 (H) 09/04/2018    INR 2.62 (H) 08/27/2018     Lab Results   Component Value Date    A1C 4.2 07/30/2018     No results found for: PTT  No results found for: UA    Assessment:    Jessica Hallman is a 57 year old female with severe aortic stenosis with an overall surgical mortality risk estimation of 2.9% based on the STS score. However, she has a number of comorbidities including liver cirrhosis with a MELD of 33. Her high output state also brings the severity of her aortic valve into question. Based on her last echocardiogram, the valve appears to be opening, it is calcified, and the LVOT measures at 1.8 cm which is quite small and could potentially underestimate the DENA along with a DI of 0.35. As a result, we will plan for an invasive measurement of her aortic valve area with a left heart catheterization at the time of her coronary angiography and right heart catheterization.     Because of her comorbidities including liver cirrhosis with a MELD of 33 she is not a surgical candidate of AVR.    Plan Summary:  1) Severe Aortic Stenosis:  -left heart catheterization with aortic valve study for DENA, right heart catheterization, coronary angiography  -Presentation of data to the Heart team to assess the optimal treatment of her severe aortic stenosisCC  Patient Care Team:  Mateo Rodriguez as PCP - General (Internal Medicine)  [address]              Please do not hesitate to contact me if you have any questions/concerns.     Sincerely,     Slick Houston MD

## 2018-10-04 NOTE — LETTER
10/4/2018      RE: Jessica Hallman  [address]  [facility] [address]       Dear Colleague,    Thank you for the opportunity to participate in the care of your patient, Jessica Hallman, at the [facility] at [facility]. Please see a copy of my visit note below.        [facility] HEART CARE  CARDIOVASCULAR DIVISION    VALVE CLINIC INITIAL CONSULTATION    PRIMARY CARDIOLOGIST: Dr. Annie Hall      PERTINENT CLINICAL HISTORY:     Jessica Hallman is a very pleasant 57 year old female with severe aortic valvular stenosis referred to our clinic for evaluation and consideration for potential transcatheter aortic valve replacement (TAVR).  her severe aortic stenosis is characterized with an area of 1.0 cm2, mean gradient 37 mmHg and peak velocity of 4.1 m/sec with LVEF >70% by echocardiogram on 9/4/18.  Additional notable medical history of cirrhosis of the liver, HCV, esophageal varices, current smoker was being assessed by Dr Hall for preoperative evaluation for liver transplant evaluation. She underwent echocardiography as part of the evaluation which showed moderate aortic stenosis with an DENA of 1.0, peak velocity of 4.1 m/s, mean pressure of 37 mmHg, DI of 0.35 and an LVOT of 1.8 mm by echo. She in not very active and does not have dyspnea or shortness of breath. Her daily exercise routine involves walking one or two blocks.     Her MELD Na score is 33, her last INR was 2.89 and platelets of 40K.         PAST MEDICAL HISTORY:     Past Medical History:   Diagnosis Date     Aortic stenosis      Ascites      HCV (hepatitis C virus)     SVR     Hemorrhagic shock and encephalopathy syndrome (H)      Hepatic encephalopathy (H)      Murmur      Osteopenia 07/2018     Tubular adenoma 01/22/2014    2 tubular adenomas, 1 sessile serrated adenoma        PAST SURGICAL HISTORY:     Past Surgical History:   Procedure Laterality Date     LAPAROSCOPIC APPENDECTOMY          CURRENT  MEDICATIONS:     Current Outpatient Prescriptions   Medication Sig Dispense Refill     acetaminophen (TYLENOL) 500 MG tablet Take 1,000 mg by mouth       B Complex-Biotin-FA (B COMPLETE) TABS Take 1 tablet by mouth See Admin Instructions       Cholecalciferol (VITAMIN D3) 400 units CHEW Take 400 Units by mouth See Admin Instructions       lactulose encephalopathy (CHRONULAC) 10 GM/15ML SOLUTION Take 10 g by mouth See Admin Instructions       Magnesium Oxide 500 MG TABS Take 500 mg by mouth daily       nadolol (CORGARD) 80 MG tablet Take 80 mg by mouth daily Taking every other day per pt       pantoprazole (PROTONIX) 40 MG EC tablet Take 40 mg by mouth 2 times daily          ALLERGIES:     Allergies   Allergen Reactions     Latex Other (See Comments)     Latex score of 6.     No Clinical Screening - See Comments Hives     Pollen Extract      Other reaction(s): Other (see comments)  Hayfever causes itchy eyes, runny nose [per patient]     Nickel Rash     Soap Rash        FAMILY HISTORY:   No family history on file.     SOCIAL HISTORY:     Social History     Social History     Marital status: Single     Spouse name: N/A     Number of children: N/A     Years of education: N/A     Social History Main Topics     Smoking status: Current Every Day Smoker     Smokeless tobacco: Never Used     Alcohol use Not on file     Drug use: Not on file     Sexual activity: Not on file     Other Topics Concern     Not on file     Social History Narrative        REVIEW OF SYSTEMS:     Constitutional: No fevers or chills  Skin: No new rash or itching  Eyes: No acute change in vision  Ears/Nose/Throat: No purulent rhinorrhea, new hearing loss, or new vertigo  Respiratory: No cough or hemoptysis  Cardiovascular: See HPI  Gastrointestinal: No change in appetite, vomiting, hematemesis or diarrhea  Genitourinary: No dysuria or hematuria  Musculoskeletal: No new back pain, neck pain or muscle pain  Neurologic: No new headaches, focal weakness  or behavior changes  Psychiatric: No hallucinations, excessive alcohol consumption or illegal drug usage  Hematologic/Lymphatic/Immunologic: No bleeding, chills, fever, night sweats or weight loss  Endocrine: No new cold intolerance, heat intolerance, polyphagia, polydipsia or polyuria      PHYSICAL EXAMINATION:     There were no vitals taken for this visit.  /78 (BP Location: Right arm, Patient Position: Chair, Cuff Size: Adult Regular)  Pulse 65  Ht 1.524 m (5')  Wt 49.7 kg (109 lb 8 oz)  SpO2 100%  BMI 21.39 kg/m2    GENERAL: No acute distress.  HEENT: scleral icterus, jaundice  Neck: No adenopathy. No thyromegaly. Symmetrical.   Heart: Regular rate and rhythm. Harsh crescendo decrescendo mid peaking systolic murmur with radiation to carotids. Delayed carotid pulses.   Lungs: Clear to auscultation. No ronchi, wheezes, rales.   Abdomen: Soft, nontender, nondistended. Bowel sounds present.  Extremities: No clubbing, cyanosis, there is bilateral pitting 1+ edema  Neurologic: Alert and oriented to person/place/time, normal speech and affect. No focal deficits.  Skin: jaundice     LABORATORY DATA:     LIPID RESULTS:  Lab Results   Component Value Date    CHOL 72 07/30/2018    HDL 15 (L) 07/30/2018    LDL 46 07/30/2018    TRIG 53 07/30/2018       LIVER ENZYME RESULTS:  Lab Results   Component Value Date    AST 72 (H) 10/04/2018    ALT 38 10/04/2018       CBC RESULTS:  Lab Results   Component Value Date    WBC 6.1 10/04/2018    RBC 2.74 (L) 10/04/2018    HGB 9.5 (L) 10/04/2018    HCT 28.4 (L) 10/04/2018     (H) 10/04/2018    MCH 34.7 (H) 10/04/2018    MCHC 33.5 10/04/2018    RDW Dimorphic population - unable to calculate 10/04/2018    PLT 54 (L) 10/04/2018       BMP RESULTS:  Lab Results   Component Value Date     10/04/2018    POTASSIUM 3.5 10/04/2018    CHLORIDE 107 10/04/2018    CO2 19 (L) 10/04/2018    ANIONGAP 10 10/04/2018     (H) 10/04/2018    BUN 34 (H) 10/04/2018    CR 1.85 (H)  10/04/2018    GFRESTIMATED 28 (L) 10/04/2018    GFRESTBLACK 34 (L) 10/04/2018    ADELA 8.8 10/04/2018        A1C RESULTS:  Lab Results   Component Value Date    A1C 4.2 07/30/2018       INR RESULTS:  Lab Results   Component Value Date    INR 2.89 (H) 09/04/2018    INR 2.62 (H) 08/27/2018          PROCEDURES & FURTHER ASSESSMENTS:     ECG: sinus    Echocardiogram: 9/4/18  Interpretation Summary  Dobutamine stress echo was aborted due to significant aortic valve stenosis.     Left ventricular systolic function is hyperkinetic with an EF >70%.  Right ventricular function, chamber size, wall motion, and thickness are  normal.  Moderate to severe aortic stenosis. The peak aortic velocity is 4.1 m/sec.  The inferior vena cava was normal in size with preserved respiratory  variability.     Previous study not available for comparison.    CT TAVR: pending, CaSc 1400    Coronary Angiogram and Right Heart Catheterization: pending    PFTs: 8/27/18  The FEV1 and FVC are reduced but the FEV1/FVC ratio is normal.  While the vital capacity and total lung capacity are within normal limits, the RV/TLC ratio is increased.  This may suggest air-trapping.  The diffusing capacity is moderately reduced.    However, the diffusing capacity was not corrected for the patient's hemoglobin.  IMPRESSION:  Non-specific reduction in FEV1 and FVC but ATS criteria not met, interpret with caution  Normal lung volumes  Moderate Diffusion Defect    Carotid Ultrasound: pending      STS RISK SCORE: 2.9  FRAILTY SCORE: Pending  NYHA CLASS: III     CLINICAL IMPRESSION:     Jessica Hallman is a 57 year old female with severe aortic stenosis with an overall surgical mortality risk estimation of 2.9% based on the STS score. However, she has a number of comorbidities including liver cirrhosis with a MELD of 33. Her high output state also brings the severity of her aortic valve into question. Based on her last echocardiogram, the valve appears to be opening, it  is calcified, and the LVOT measures at 1.8 cm which is quite small and could potentially underestimate the DENA along with a DI of 0.35. As a result, we will plan for an invasive measurement of her aortic valve area with a left heart catheterization at the time of her coronary angiography and right heart catheterization.    Plan Summary:  1) Severe Aortic Stenosis:  -left heart catheterization with aortic valve study for DENA, right heart catheterization, coronary angiography  -Presentation of data to the Heart team to assess the optimal treatment of her severe aortic stenosis    Patient was evaluated in clinic with Dr. Michael of interventional cardiology and Dr. Houston of cardiothoracic surgery.      Herve Humphries  Interventional Cardiology Fellow  [phone]    CC  Patient Care Team:  Mateo Rodriguez as PCP - General (Internal Medicine)  [address]        Patient seen and examined with Cardiovascular fellow and agree with the assessment and plan described above.     Rancho Michael M.D.  Interventional Cardiology  [facility]

## 2018-10-04 NOTE — NURSING NOTE
Chief Complaint   Patient presents with     New Patient     1ST TAVR [place]   Vitals were taken and medications were reconciled.     Latoya Varma MA    2:11 PM

## 2018-10-04 NOTE — MR AVS SNAPSHOT
"              After Visit Summary   10/4/2018    Jessica Hallman    MRN: [id number]           Patient Information     Date Of Birth          1960        Visit Information        Provider Department      10/4/2018 1:45 PM Slick Houston MD [facility]        Today's Diagnoses     Nonrheumatic aortic valve stenosis    -  1       Follow-ups after your visit        Who to contact     If you have questions or need follow up information about today's clinic visit or your schedule please contact [facility] directly at [phone].  Normal or non-critical lab and imaging results will be communicated to you by [employer], letter or phone within 4 business days after the clinic has received the results. If you do not hear from us within 7 days, please contact the clinic through [employer] or phone. If you have a critical or abnormal lab result, we will notify you by phone as soon as possible.  Submit refill requests through [employer] or call your pharmacy and they will forward the refill request to us. Please allow 3 business days for your refill to be completed.          Additional Information About Your Visit        MyChart Information     [employer] lets you send messages to your doctor, view your test results, renew your prescriptions, schedule appointments and more. To sign up, go to [email or web address] . Click on \"Log in\" on the left side of the screen, which will take you to the Welcome page. Then click on \"Sign up Now\" on the right side of the page.     You will be asked to enter the access code listed below, as well as some personal information. Please follow the directions to create your username and password.     Your access code is: [email or web address]  Expires: 2019  9:29 AM     Your access code will  in 90 days. If you need help or a new code, please call your [place] clinic or [phone].        Care EveryWhere ID     This is your Care EveryWhere ID. This could be used by other " organizations to access your [facility] medical records  [id number]        Your Vitals Were     Pulse Height BMI (Body Mass Index)             65 1.524 m (5') 21.44 kg/m2          Blood Pressure from Last 3 Encounters:   10/04/18 122/78   10/04/18 122/78   09/04/18 138/81    Weight from Last 3 Encounters:   10/04/18 49.8 kg (109 lb 12.8 oz)   10/04/18 49.7 kg (109 lb 8 oz)   09/04/18 59 kg (130 lb)              Today, you had the following     No orders found for display       Primary Care Provider Office Phone # Fax #    Mateo PEREZ [facility] [phone]       [facility] [address]   [facility] [address]        Equal Access to Services     VIRGIL BOYCE : Hadii aad ku hadasho Soomaali, waaxda luqadaha, qaybta kaalmada adeegyada, waxay daliain hayaan lucy camacho . So [facility] [phone].    ATENCIÓN: Si habla español, tiene a hernadez disposición servicios gratuitos de asistencia lingüística. Llame al [phone].    We comply with applicable federal civil rights laws and [place] laws. We do not discriminate on the basis of race, color, national origin, age, disability, sex, sexual orientation, or gender identity.            Thank you!     Thank you for choosing [facility]  for your care. Our goal is always to provide you with excellent care. Hearing back from our patients is one way we can continue to improve our services. Please take a few minutes to complete the written survey that you may receive in the mail after your visit with us. Thank you!             Your Updated Medication List - Protect others around you: Learn how to safely use, store and throw away your medicines at www.disposemymeds.org.          This list is accurate as of 10/4/18 11:59 PM.  Always use your most recent med list.                   Brand Name Dispense Instructions for use Diagnosis    acetaminophen 500 MG tablet    TYLENOL     Take 1,000 mg by mouth        B COMPLETE Tabs      Take 1 tablet by  mouth See Admin Instructions        lactulose encephalopathy 10 GM/15ML SOLUTION    CHRONULAC     Take 10 g by mouth See Admin Instructions        Magnesium Oxide 500 MG Tabs      Take 500 mg by mouth daily        nadolol 80 MG tablet    CORGARD     Take 80 mg by mouth daily Taking every other day per pt        pantoprazole 40 MG EC tablet    PROTONIX     Take 40 mg by mouth 2 times daily        vitamin D3 400 units Chew      Take 400 Units by mouth See Admin Instructions

## 2018-10-05 NOTE — NURSING NOTE
Left Heart Catheterization: Patient given Coronary Angiogram and Angioplasty: A Patient's Guide booklet. Patient was instructed regarding left heart catheterization, including discussion of the indication, procedure, preparation, intra-procedural steps, and recovery at home. Patient demonstrated understanding of this information and agreed to call with further questions or concerns.  Med Reconcile: Reviewed and verified all current medications with the patient. The updated medication list was printed and given to the patient.  Return Appointment: Patient given instructions regarding scheduling next clinic visit. Patient demonstrated understanding of this information and agreed to call with further questions or concerns.  Right Heart Catheterization: Patient was instructed regarding right heart catheterization, including discussion of the procedure, preparation, intra-procedural steps, and recovery at home. Patient demonstrated understanding of this information and agreed to call with further questions or concerns.  Patient stated she understood all health information given and agreed to call with further questions or concerns.    Cardiology Providers you saw during your visit:  Dr. Michael and Dr. Houston    Recommendations:  Please have a coronary angiogram.    You are scheduled for a [place] [address]. You are to check in at the [facility] at 6:30am on October 23 with Dr. Ferguson    Pre procedure instructions:  1. Please make arrangements to have a  as you will not be allowed to drive following the procedure.  2. Do not eat or drink after midnight the day of your procedure.  3. You may take your usual morning medications with a sip of water the morning of your procedure.  4. Make arrangements to have someone stay with you the night after your procedure.    Please call me if you have questions  regarding these instructions or your scheduled procedure.    Follow-up: Based upon results of your angiogram and right heart cath.

## 2018-10-12 NOTE — PROGRESS NOTES
[facility]  Transfer Triage Note  57-year-old female hepatitis C cirrhosis s/p  treatment found to have significant aortic stenosis with valve area of 1.0 On  routine echo for transplant workup.  Was admitted to [facility] 4 days ago for upper GI bleed 2/2p portal hypertensive gastropathy.  Ended up in the intensive care unit requiring pressors and multiple blood product. EGD done 3 time-->still oozing . They are looking to transfer patient for possible TIPS.  Case was discussed with Dr. Tavarez from hepatology.   Patient  liver transplant case was reviewed on September 4 and was ruled to be not a candidate due AS .  And according to GI patient is currently also high risk for TIPS given MELD. Patient will stay in [place] optimizing medical management and considering palliative care consult. Please contact hepatology  team Dr. Tavarez over the weekend  to discuss if  patient is a candidate for TIPS before transfer to the [facility].   * Might need ICU bed.     Date of call: 10/12/2018  Time of call: 3:30    Reason for Transfer:  For consideration of TIPS/patinet has not been accepted for transfer yet     Diagnosis: HCV Cirhosis and Critical Aortic  stenosis     Outside Records: Available    Stability of Patient: Not stable. Continues to bleed.     Malachi Heath MD  Hospitalist/nocturnist  [facility]    Departments of Medicine   Pager: [phone]

## 2018-10-15 NOTE — LETTER
October 15, 2018      TO: MADDY CRAIG [address]  [place] [address]    RE:  Jessica Hallman           Dear Maddy,    Thank you so much for your phone call.  If there is anything more that I can assist with, please do not hesitate to contact me, or Sandra.    Please find a copy of the radiology report completed at the time of your mom's CT Angiogram of the chest, abdomen, and pelvis.    Sincerely,  Elizabeth Mendoza RN  Lead Structural Heart Care Coordinator    TAVR, [employer] and Watchman Programs  [facility]  Office: [phone]; option 1 then chose option 3 for the triage nurse; then ask to speak with Elizabeth                    Study Result       Limited CT of the chest abdomen and pelvis without and with contrast     History:  n; Severe aortic stenosis       Comparison: None     TECHNIQUE: Limited CT of the chest abdomen and pelvis without contrast  in flash mode. Following the uneventful administration of intravenous  contrast, limited CT of the chest was also performed in mode. Total  . Exam was ECG gated . The patient was scanned from the level  of the apices to include the upper thighs.     Findings: Severe coronary calcifications. Heart is not enlarged. Main  pulmonary artery and aorta are not enlarged. No pleural or pericardial  effusion. Aortic valvular calcification. Atherosclerotic  calcifications of the aorta and branch vessels. No enlarged  mediastinal, hilar or axillary lymph nodes.      7 mm nodule right lower lobe posteriorly image 76.      3 mm nodule right lower lobe image 60 series 11.      9 mm subsolid nodule with 6 mm solid component left lower lobe  superior segment image 48 series 11.      6 mm nodule left lower lobe image 77.     Paraesophageal varices. Nodular cirrhotic appearing liver. Spleen is  nonenlarged. Pancreas shows some anterior near the pancreatic head  probably within the bile duct possibly from ERCP. Adrenal glands are  not  enlarged. Cholelithiasis. Kidneys are unremarkable.     Extensive ascites in the abdomen and pelvis.     Bones: No suspicious bony lesion.         IMPRESSION:  1. Scattered pulmonary nodules are indeterminant measuring up to 9 mm.  Recommend follow-up in one to 3 months is differential includes  neoplasm and infection.  2. Please see accompanying report of CT cardiac for details regarding  the heart.   3. Cirrhotic appearing liver with extensive ascites and varices  compatible with pulmonary hypertension.     [Consider Follow Up: Lung nodule]     This report will be copied to the [facility] to ensure a  provider acknowledges the finding.      TERRI FOX MD

## 2018-10-15 NOTE — TELEPHONE ENCOUNTER
Health Call Center    Phone Message    May a detailed message be left on voicemail: yes    Reason for Call: Other: Patient's son, Ayden, called in and would like to speak with someone about next steps for patient's care. Will forward message on to TAVR Care Coordinators.     Action Taken: Message routed to:  [facility] ([facility]): Sandra Villegas and Elizabeth Mendoza

## 2018-10-15 NOTE — PROGRESS NOTES
Ayden states that his mom, the pt, is in [facility] for GIB.    He is anxious to move forward with evaluation for the possible TAVR, which means his mom may then be listed for possible liver transplant.    I explained that there was conflicting information regarding the severity of the aortic valve stenosis.  She is scheduled for a coronary angiogram and at this time the MD's would measure the gradient across the Ao valve to determine the severity.    Results from the imaging for TAVR were discussed with Ayden.  Radiology report showing scattered pulmonary nodules was discussed with him, along with the report being mailed to him.    Will continue to follow up.
